# Patient Record
Sex: MALE | Race: WHITE | NOT HISPANIC OR LATINO | Employment: FULL TIME | URBAN - METROPOLITAN AREA
[De-identification: names, ages, dates, MRNs, and addresses within clinical notes are randomized per-mention and may not be internally consistent; named-entity substitution may affect disease eponyms.]

---

## 2017-01-03 ENCOUNTER — OFFICE VISIT (OUTPATIENT)
Dept: SLEEP MEDICINE | Facility: CLINIC | Age: 42
End: 2017-01-03
Attending: FAMILY MEDICINE
Payer: COMMERCIAL

## 2017-01-03 VITALS
DIASTOLIC BLOOD PRESSURE: 80 MMHG | HEART RATE: 83 BPM | HEIGHT: 73 IN | WEIGHT: 225 LBS | SYSTOLIC BLOOD PRESSURE: 119 MMHG | BODY MASS INDEX: 29.82 KG/M2 | OXYGEN SATURATION: 97 %

## 2017-01-03 DIAGNOSIS — E66.3 OVERWEIGHT: Primary | ICD-10-CM

## 2017-01-03 DIAGNOSIS — G47.33 OSA (OBSTRUCTIVE SLEEP APNEA): ICD-10-CM

## 2017-01-03 PROCEDURE — 99204 OFFICE O/P NEW MOD 45 MIN: CPT | Performed by: INTERNAL MEDICINE

## 2017-01-03 NOTE — PROGRESS NOTES
[facility]  Outpatient Sleep Medicine Consultation  January 3, 2017      Name: Wayne Lam MRN# [id number]   Age: 41 year old YOB: 1975     Date of Consultation: January 3, 2017  Consultation is requested by: Diogo Chiu MD  [facility]  [address]  [place] [address]  Primary care provider: Diogo Chiu  Home clinic: [facility]         Reason for Sleep Consult:     Wayne Lam is a 41 year old male reevaluation of sleep apnea.         Assessment and Plan:     Summary Sleep Diagnoses/Recommendations:    1. Mild Obstructive Sleep Apnea: Discussed with patient the recent sleep study and treatment options, we recommend oral appliance for the treatment of his mild DEJUAN and get relieve from snoring. Avoid sleeping supine position and weight loss as below.  Instructions given. Follow up 6 moths after oral device use and repeat home sleep study to determine efficacy of oral device.    2. Overweight:  Counseled regarding weight loss through diet modification and increased physical activity. Patient was given instuctions of weight loss and advised to follow up her PCP for further weight loss interventions.      Orders Placed This Encounter   Procedures     SLEEP DENTAL REFERRAL       Summary Counseling:  See instructions    Counseling included a comprehensive review of diagnostic and therapeutic strategies as well as risks of inadequate therapy.  Educational materials provided in instructions.    All questions were answered.  The patient indicates understanding of the above issues and agrees with the plan set forth.           History of Present Illness:     Wayne Lam is a 41 year old male with history of sleep apnea and overweight who presents to the [facility] in [place] for re-evaluation of sleep apnea, establish care. Patient had free testing sleep study at [facility] sleep clinic on 12/1/16 which showed mild  sleep apnea AHI 7.9/hr (RDI 31.7/hr). He snores but not sure stop breathing or gasping. He toss and turn.  He feels tired and sleep (EDS) during the day.He gained 30 lbs.       Please see below for sleep ROS details.    PREVIOUS IN- LAB or HOME SLEEP STUDIES:   Date: 12/1/16   TYPE: PSG   AHI: 7.9/hr   Lowest O 2 sat: 88%   Intervention: APAP recommended   Sleep Architecture: all stage sleeps achieved, reduced REM       SLEEP-WAKE SCHEDULE:     Wayne Lam     -Describes themself as a morning person;      -ON WEEKDAYS, goes to sleep at 9:00 PM during the week; awakens  7:00 AM with an alarm; falls asleep in 5 minutes; denies difficulty falling asleep.     -ON WEEKENDS, goes to sleep at 10:00 PM and wakes up at 8:00 AM without an alarm; falls asleep in 5 minutes.       -Awakens 2 times a night for 5 minutes before falling back to sleep; awakens to go to the bathroom.      -Total sleep time: 8 hours per night.    -Naps 0 times per week.      BEDTIME ACTIVITIES AND SHIFT WORK:    Wayne Lam    -does watch TV in bed and does not use electronics in bed and read in bed.     -does not do shift work.  He/she works day shifts.       SCALES       SLEEP APNEA: Stopbang score: 4       INSOMNIA:  Insomnia severity score: N/A       SLEEPINESS: Downey sleepiness scale (ESS):  12   Drowsy driving/near accidents: No          PHQ9: N/A    SLEEP COMPLAINTS:   Snoring- 7 days/week  Witness apnea: Yes  Gasping/Choking: Yes  Excessive daytime sleep: Yes  Toss/turn: Yes  Excessive tiredness/fatigue:  Yes  Morning headaches: Yes  Dry mouth/throat: Yes  Dyspnea: No  Coexisting Lung disease: No    Coexisting Heart disease: No    Does patient have a bed partner: Yes  Has bed partner been sleeping separately because of snoring:  No            RLS Screen: When you try to relax in the evening or sleep at  night, do you ever have unpleasant, restless feelings in your  legs that can be relieved by walking or movement? No    Periodic limb  movement: No    Narcolepsy:       denies sudden urges of sleep attacks     denies cataplexy     denies sleep paralysis      denies hallucinations     Sleep Behaviors:     denies leg symptoms/movements     denies motor restlessness     denies night terrors     denies bruxism     denies automatic behaviors    Other subjective complaints:     denies anxiety or rumination      denies pain and discomfort at  night     denies waking up with heart pounding or racing     denies GERD/heartburn         Parasomnia:   NREM - denies recurrent persistent confusional arousal, night eating or sleep terrors. He had sleep walking, last more than years ago.  REM  - denies dream enactment; injuries     Safety: None             Medications:     Current Outpatient Prescriptions   Medication Sig     PARoxetine (PAXIL) 10 MG tablet Take 0.5-1 tablets (5-10 mg) by mouth At Bedtime     finasteride (PROPECIA) 1 MG tablet Take 1 tablet (1 mg) by mouth daily     No current facility-administered medications for this visit.        Medication that can affect sleep: Paxil    No Known Allergies         Past Medical History:     Does not need 02 supplement at night     Past Medical History   Diagnosis Date     Tobacco abuse 2/3/2012     CARDIOVASCULAR SCREENING; LDL GOAL LESS THAN 160 2/3/2012     BPH (benign prostatic hyperplasia) 2/3/2012     Paralysis (H) 2/3/2012     Snoring 2/3/2012     Accessory nipple      Overweight (BMI 25.0-29.9) 2/3/2012               Past Surgical History:     No previous upper airway surgery     Past Surgical History   Procedure Laterality Date     Lasik bilateral       Hair transplant              Social History:     Social History   Substance Use Topics     Smoking status: Former Smoker     Types: Cigarettes     Smokeless tobacco: Never Used      Comment: 2 packs/week     Alcohol Use: 0.0 oz/week     0 Standard drinks or equivalent per week      Comment: occas         Chemical History:     Tobacco: No, former smoker     "  Uses 1-2 cups/day of coffee. Last caffeine intake is usually before 9 am    Supplements for wakefulness: No    EtOH: Yes, occasionally  Recreational Drugs: No    Psych Hx:   None    Current dangers to self or others: None           Family History:     Family History   Problem Relation Age of Onset     CANCER       breast     Myocardial Infarction       CEREBROVASCULAR DISEASE          Sleep Family Hx:        RLS- Brother  DEJUAN - Father  Insomnia - No  Parasomnia - No         Review of Systems:     A complete 10 point review of systems was negative other than HPI or as commented below:   Patient denies chest pain, dyspnea with activity and or rest, wheezing, abdominal pain, n&v, fever, chills, dysuria, leg pain or swelling. Patient is also denies ear pain, sore throat, postnasal drip, running nose, dry cough.      Wayne Lam has gained 30 pounds in 10 years.            Physical Examination:   /80 mmHg  Pulse 83  Ht 1.854 m (6' 1\")  Wt 102.059 kg (225 lb)  BMI 29.69 kg/m2  SpO2 97%     Neck Circumference: 44 cm   Constitutional: . Awake, alert, cooperative, in no apparent distress  Mood: euthymic; affect congruent with full range and intensity.  Attention/Concentration:  Normal   Eyes: Pupils round and reactive. No icterus.  ENT: Mallampati Class: IV.   Tonsillar Stage: 0  surgically removed or 1  hidden by pillars, right +2 bigger than left  Clear nasal passages. Enlarged inferior turbinates. No deviated septum.  Oropharynx: No high arched palate. No pharyngeal erythema or exudates, elongated uvula. No lateral narrowing  Tongue: No macroglossia   Dentition: Good.  Dentures: None  Neck: Supple, no thyroid enlargement.   Cardiovascular: Regular S1 and S2, no gallops or murmurs.   Pulmonary:  Chest symmetric, lungs clear bilaterally and no crackles, wheezes or rales.  Abdomen: Soft, obese, non tender.  Extremities:  No pedal edema. Tattoos left arm.  Muscle/joint: Strength and tone normal   Skin:  No rash " or significant lesions.   Neurologic: Alert, oriented x3, no focal neurological deficit.           Data: All pertinent previous laboratory data reviewed     No results found for: PH, PHARTERIAL, PO2, [id number], SAT, PCO2, HCO3, BASEEXCESS, MARK, BEB  Lab Results   Component Value Date    TSH 1.60 09/27/2016    TSH 1.05 02/03/2012     Lab Results   Component Value Date    * 09/27/2016    GLC 94 02/14/2012     Lab Results   Component Value Date    HGB 15.1 02/03/2012     Lab Results   Component Value Date    BUN 13 09/27/2016    BUN 17 02/14/2012    CR 1.34* 09/27/2016    CR 1.25 02/14/2012     Lab Results   Component Value Date    CO2 28 09/27/2016    CO2 27 02/14/2012     No results found for: VARUN      Echocardiography: No    Chest x-ray: No    PFT: 2/3/12, was normal  FVC 88%  FEV1: 84%  FVC/FEV1: 93%        Copy to: Diogo Chiu MD 1/3/2017   [facility] [address], [place] [address]   [phone] Clinic    Total time spent with patient: 46 minutes with this patient today in which 25 minutes was spent in counseling/coordination of care and going over planned testing and recommendations.

## 2017-01-03 NOTE — PATIENT INSTRUCTIONS
"MY CONTACT NUMBERS ARE:  [phone]  DOCTOR : Art ROSADO hetal  SLEEP CENTER :  [facility]    You have mild sleep apnea/snoring: oral appliance is recommended for you.    Frequently asked questions:  1. What is Obstructive Sleep Apnea (DEJUAN)? DEJUAN is the most common type of sleep apnea. Apnea literally means, \"without breath.\" It is characterized by repetitive pauses in breathing, despite continued effort to breathe, and is usually associated with a reduction in blood oxygen saturation. Apneas can last 10 to over 60 seconds. It is caused by narrowing or collapse of the upper airway as muscles relax during sleep. Severity of sleep apnea is determined by frequency of breathing events and their effect on your sleep and oxygen levels determined during sleep testing.   2. What are the consequences of DEJUAN? Symptoms include: daytime sleepiness- possibly increasing the risk of falling asleep while driving, unrefreshing/restless sleep, snoring, insomnia, waking frequently to urinate, waking with heartburn or reflux, reduced concentration and memory, and morning headaches. Other health consequences may include development of high blood pressure and other cardiovascular disease in persons who are susceptible. Untreated DEJUAN  can contribute to heart disease, stroke and diabetes.   3. What are the treatment options? In most situations, sleep apnea is a lifelong disease that must be managed with daily therapy. Medications are not effective for sleep apnea and surgery is generally not performed until other therapies have been tried. Therapy is usually tailored to the individual patient based on many factors including your wishes as well as severity of sleep apnea and severity of obesity. Continuous Positive Airway (CPAP) is the most reliable treatment. An oral device to hold your jaw forward is usually  Oral Appliance  These are examples of two of many custom-made devices that are more likely to work in mild sleep apnea           "                                            Oral appliances are dental mouth pieces that fit very much like a sports mouth guards or removable orthodontic retainers. They are used to treat snoring  And obstructive sleep apnea . The device prevents the airway from collapsing by either holding the tongue or supporting the jaw in a forward position. Since oral appliances are non-invasive and easy to use, they may be considered as an early treatment option. Oral appliance therapy (OAT) involves the customization, selection, fabrication, fitting, adjustments and long-term follow-up care of specially designed oral devices, worn during sleep, which reposition the lower jaw and tongue base forward to maintain an open airway.  Custom made oral appliances are proven to be more effective than over-the-counter devices. Therefore, the over-the-counter devices are recommended not to be used as a screening tool nor as a therapeutic option.     Who gets a dental device?  Oral appliance therapy can be used as an alternative to  CPAP therapy for the treatment of mild to moderate sleep apnea and for those patients who prefer OAT to CPAP. Oral appliance therapy is a first line therapy for the treatment of primary snoring. Additionally, OAT is an option for those that cannot tolerate CPAP as therapy or who have experienced insufficient surgical results.                 Possible side effects?  Frequent but minor side effects include: excessive salivation, dry mouth, discomfort of teeth and jaw and temporary changes in the patient s bite.  Potential complications include: jaw pain, permanent occlusal changes and TMJ symptoms.  The above mentioned side effects and complications can be recognized and managed by dentists trained in dental sleep medicine.   Finding a dentist that practices dental sleep medicine  Specific training is available through the American Academy of Dental Sleep Medicine for dentists interested in working in the field  of sleep. To find a dentist who is educated in the field of sleep and the use of oral appliances, near you, visit the Web site of the American Academy of Dental Sleep Medicine; also see http://www.accpstorage.org/newOrganization/patients/oralAppliances.pdf   To search for a dentist certified in these practices:  Http://aadsm.org/FindADentist.aspx?1  Http://www.accpstorage.org/newOrganization/patients/oralAppliances.pdf        Your BMI is Body mass index is 29.69 kg/(m^2).  Weight management is a personal decision.  If you are interested in exploring weight loss strategies, the following discussion covers the approaches that may be successful. Body mass index (BMI) is one way to tell whether you are at a healthy weight, overweight, or obese. It measures your weight in relation to your height.  A BMI of 18.5 to 24.9 is in the healthy range. A person with a BMI of 25 to 29.9 is considered overweight, and someone with a BMI of 30 or greater is considered obese. More than two-thirds of American adults are considered overweight or obese.  Being overweight or obese increases the risk for further weight gain. Excess weight may lead to heart disease and diabetes.  Creating and following plans for healthy eating and physical activity may help you improve your health.  Weight control is part of healthy lifestyle and includes exercise, emotional health, and healthy eating habits. Careful eating habits lifelong are the mainstay of weight control. Though there are significant health benefits from weight loss, long-term weight loss with diet alone may be very difficult to achieve- studies show long-term success with dietary management in less than 10% of people. Attaining a healthy weight may be especially difficult to achieve in those with severe obesity. In some cases, medications, devices and surgical management might be considered.  What can you do?  If you are overweight or obese and are interested in methods for weight loss,  you should discuss this with your provider.     Consider reducing daily calorie intake by 500 calories.     Keep a food journal.     Avoiding skipping meals, consider cutting portions instead.    Diet combined with exercise helps maintain muscle while optimizing fat loss. Strength training is particularly important for building and maintaining muscle mass. Exercise helps reduce stress, increase energy, and improves fitness. Increasing exercise without diet control, however, may not burn enough calories to loose weight.       Start walking three days a week 10-20 minutes at a time    Work towards walking thirty minutes five days a week     Eventually, increase the speed of your walking for 1-2 minutes at time    In addition, we recommend that you review healthy lifestyles and methods for weight loss available through the National Institutes of Health patient information sites:  http://win.niddk.nih.gov/publications/index.htm    And look into health and wellness programs that may be available through your health insurance provider, employer, local community center, or [employer].      Your blood pressure was checked while you were in clinic today.  Please read the guidelines below about what these numbers mean and what you should do about them.  Your systolic blood pressure is the top number.  This is the pressure when the heart is pumping.  Your diastolic blood pressure is the bottom number.  This is the pressure in between beats.  If your systolic blood pressure is less than 120 and your diastolic blood pressure is less than 80, then your blood pressure is normal. There is nothing more that you need to do about it  If your systolic blood pressure is 120-139 or your diastolic blood pressure is 80-89, your blood pressure may be higher than it should be.  You should have your blood pressure re-checked within a year by a primary care provider.  If your systolic blood pressure is 140 or greater or your diastolic blood  pressure is 90 or greater, you may have high blood pressure.  High blood pressure is treatable, but if left untreated over time it can put you at risk for heart attack, stroke, or kidney failure.  You should have your blood pressure re-checked by a primary care provider within the next four weeks.

## 2017-01-03 NOTE — NURSING NOTE
"Chief Complaint   Patient presents with     Consult     DEJUAN , Ref Chiu       Initial /80 mmHg  Pulse 83  Ht 1.854 m (6' 1\")  Wt 102.059 kg (225 lb)  BMI 29.69 kg/m2  SpO2 97% Estimated body mass index is 29.69 kg/(m^2) as calculated from the following:    Height as of this encounter: 1.854 m (6' 1\").    Weight as of this encounter: 102.059 kg (225 lb).  BP completed using cuff size: regular      Neck 44  17 1/2  Ess 12    Gabrielle Taveras LPN  "

## 2017-01-03 NOTE — MR AVS SNAPSHOT
"              After Visit Summary   1/3/2017    Wayne Lam    MRN: [id number]           Patient Information     Date Of Birth          1975        Visit Information        Provider Department      1/3/2017 10:00 AM Art Delarosa MD [facility]        Today's Diagnoses     DEJUAN (obstructive sleep apnea)           Care Instructions    MY CONTACT NUMBERS ARE:  [phone]  DOCTOR : Art Delarosa  SLEEP CENTER :  [place]    You have mild sleep apnea/snoring: oral appliance is recommended for you.    Frequently asked questions:  1. What is Obstructive Sleep Apnea (DEJUAN)? DEJUAN is the most common type of sleep apnea. Apnea literally means, \"without breath.\" It is characterized by repetitive pauses in breathing, despite continued effort to breathe, and is usually associated with a reduction in blood oxygen saturation. Apneas can last 10 to over 60 seconds. It is caused by narrowing or collapse of the upper airway as muscles relax during sleep. Severity of sleep apnea is determined by frequency of breathing events and their effect on your sleep and oxygen levels determined during sleep testing.   2. What are the consequences of DEJUAN? Symptoms include: daytime sleepiness- possibly increasing the risk of falling asleep while driving, unrefreshing/restless sleep, snoring, insomnia, waking frequently to urinate, waking with heartburn or reflux, reduced concentration and memory, and morning headaches. Other health consequences may include development of high blood pressure and other cardiovascular disease in persons who are susceptible. Untreated DEJUAN  can contribute to heart disease, stroke and diabetes.   3. What are the treatment options? In most situations, sleep apnea is a lifelong disease that must be managed with daily therapy. Medications are not effective for sleep apnea and surgery is generally not performed until other therapies have been tried. Therapy is usually tailored to " the individual patient based on many factors including your wishes as well as severity of sleep apnea and severity of obesity. Continuous Positive Airway (CPAP) is the most reliable treatment. An oral device to hold your jaw forward is usually  Oral Appliance  These are examples of two of many custom-made devices that are more likely to work in mild sleep apnea                                                      Oral appliances are dental mouth pieces that fit very much like a sports mouth guards or removable orthodontic retainers. They are used to treat snoring  And obstructive sleep apnea . The device prevents the airway from collapsing by either holding the tongue or supporting the jaw in a forward position. Since oral appliances are non-invasive and easy to use, they may be considered as an early treatment option. Oral appliance therapy (OAT) involves the customization, selection, fabrication, fitting, adjustments and long-term follow-up care of specially designed oral devices, worn during sleep, which reposition the lower jaw and tongue base forward to maintain an open airway.  Custom made oral appliances are proven to be more effective than over-the-counter devices. Therefore, the over-the-counter devices are recommended not to be used as a screening tool nor as a therapeutic option.     Who gets a dental device?  Oral appliance therapy can be used as an alternative to  CPAP therapy for the treatment of mild to moderate sleep apnea and for those patients who prefer OAT to CPAP. Oral appliance therapy is a first line therapy for the treatment of primary snoring. Additionally, OAT is an option for those that cannot tolerate CPAP as therapy or who have experienced insufficient surgical results.                 Possible side effects?  Frequent but minor side effects include: excessive salivation, dry mouth, discomfort of teeth and jaw and temporary changes in the patient s bite.  Potential complications include:  jaw pain, permanent occlusal changes and TMJ symptoms.  The above mentioned side effects and complications can be recognized and managed by dentists trained in dental sleep medicine.   Finding a dentist that practices dental sleep medicine  Specific training is available through the American Academy of Dental Sleep Medicine for dentists interested in working in the field of sleep. To find a dentist who is educated in the field of sleep and the use of oral appliances, near you, visit the Web site of the American Academy of Dental Sleep Medicine; also see http://www.accpstorage.org/newOrganization/patients/oralAppliances.pdf   To search for a dentist certified in these practices:  Http://aadsm.org/FindADentist.aspx?1  Http://www.accpstorage.org/newOrganization/patients/oralAppliances.pdf        Your BMI is Body mass index is 29.69 kg/(m^2).  Weight management is a personal decision.  If you are interested in exploring weight loss strategies, the following discussion covers the approaches that may be successful. Body mass index (BMI) is one way to tell whether you are at a healthy weight, overweight, or obese. It measures your weight in relation to your height.  A BMI of 18.5 to 24.9 is in the healthy range. A person with a BMI of 25 to 29.9 is considered overweight, and someone with a BMI of 30 or greater is considered obese. More than two-thirds of American adults are considered overweight or obese.  Being overweight or obese increases the risk for further weight gain. Excess weight may lead to heart disease and diabetes.  Creating and following plans for healthy eating and physical activity may help you improve your health.  Weight control is part of healthy lifestyle and includes exercise, emotional health, and healthy eating habits. Careful eating habits lifelong are the mainstay of weight control. Though there are significant health benefits from weight loss, long-term weight loss with diet alone may be very  difficult to achieve- studies show long-term success with dietary management in less than 10% of people. Attaining a healthy weight may be especially difficult to achieve in those with severe obesity. In some cases, medications, devices and surgical management might be considered.  What can you do?  If you are overweight or obese and are interested in methods for weight loss, you should discuss this with your provider.     Consider reducing daily calorie intake by 500 calories.     Keep a food journal.     Avoiding skipping meals, consider cutting portions instead.    Diet combined with exercise helps maintain muscle while optimizing fat loss. Strength training is particularly important for building and maintaining muscle mass. Exercise helps reduce stress, increase energy, and improves fitness. Increasing exercise without diet control, however, may not burn enough calories to loose weight.       Start walking three days a week 10-20 minutes at a time    Work towards walking thirty minutes five days a week     Eventually, increase the speed of your walking for 1-2 minutes at time    In addition, we recommend that you review healthy lifestyles and methods for weight loss available through the National Institutes of Health patient information sites:  http://win.niddk.nih.gov/publications/index.htm    And look into health and wellness programs that may be available through your health insurance provider, employer, local community center, or ed[employer]ardo club.      Your blood pressure was checked while you were in clinic today.  Please read the guidelines below about what these numbers mean and what you should do about them.  Your systolic blood pressure is the top number.  This is the pressure when the heart is pumping.  Your diastolic blood pressure is the bottom number.  This is the pressure in between beats.  If your systolic blood pressure is less than 120 and your diastolic blood pressure is less than 80, then your blood  pressure is normal. There is nothing more that you need to do about it  If your systolic blood pressure is 120-139 or your diastolic blood pressure is 80-89, your blood pressure may be higher than it should be.  You should have your blood pressure re-checked within a year by a primary care provider.  If your systolic blood pressure is 140 or greater or your diastolic blood pressure is 90 or greater, you may have high blood pressure.  High blood pressure is treatable, but if left untreated over time it can put you at risk for heart attack, stroke, or kidney failure.  You should have your blood pressure re-checked by a primary care provider within the next four weeks.              Follow-ups after your visit        Additional Services     SLEEP DENTAL REFERRAL       Dental appliance resources recommended by [facility]     Below is a list of dental appliance resources recommended by [facility]   If you wish to choose your own dental sleep dentist, you may identify a provider close to you: http://www.[email or web address]/[email or web address]    [facility]   [facility]   Trung Adams DDS, [address]  [place] [address]   Appointments: [phone]  Fax: [phone]   Email: [email or web address]   [facility]   Canelo Urbano, ELIZABETH Davis DDS   [address]   [place] [address]   Appointments: [phone]   Website: [email or web address]    Snoring and Sleep Apnea   D[facility] Treatment Center   DERRICK Heath DDS  [address], [address]   [place] [address]   Appointments: [phone]   Fax: [phone]   Email: [email or web address]  Website: [employer]     [facility]   Office 1   Sourav Varner DDS - [DME Medicare]  [address] [place] [address]  [facility], [address]   [place] [address]  Appointments: [phone]  Fax: [phone]  Website: [employer]    [facility]   Office 2  Sourav Varner DDS - [DME Medicare]  [address]  [place] [address]  Appointments: [phone]  Fax: [phone]  Website: [facility]    [facility]  Glynn Hernandez DDS  [address] [place] [address]  Appointments: [phone]    [facility]   [place] Office   Justo Davis DDS   [facility]   [address], [address]   [place] [address]   Appointments: [phone]   Fax: [phone]   Website: [employer]     [facility]   [place] Office   Kin Young DDS, MS - [DME Medicare]  [facility]   [address], [address]   [place] [address]   Appointments: [phone]   Fax: [phone]   Website: [employer]     [facility]  Jason Rankin DMD, MSD - [DME Medicare]  [address], [address]  [place] [address]  Appointments [phone]  Fax: [phone]  Website: [employer]    [facility]   [place] Office   Edwige Bassett DDS, PhD, MS   [facility]   [address]   [place] [address]   Appointments: [phone]   Fax: [phone]   Website: [employer]     [facility]   [place] Office   Edwige Bassett DDS, PhD, MS   [facility]   [address]   [place] [address]   Appointments: [phone]   Fax: [phone]   Website: [employer]     [facility]  Tawana Bell DDS  [facility]  [address] [place] [address]  Appointments: [phone]   Fax: [phone]      If you wish to choose your own dental sleep dentist, you may identify a provider close to you:  [email or web address]      AHI: 7.9 PSG DATE: 12/1/16     Return to clinic in 6 months for Home Sleep Test to confirm adequacy of Treatment.    Other information regarding this referral: Mandibular repositioning appliance for DEJUAN. If your insurance asks for a CPT code, it is .    Please be aware that coverage of these services is subject to the terms and limitations of your health insurance plan.  Call member services at your health plan with any benefit or coverage questions.      Please bring the following to your appointment:    >>   List of current medications   >>   This referral request   >>   Any documents/labs given to you for this referral                  Who to contact     If you have questions or need follow up information about today's clinic visit or your schedule please contact [facility] directly at [phone].  Normal or non-critical lab and imaging results will be communicated to you by MyChart, letter or phone within 4 business days after the clinic has received the results. If you do not hear from us within 7 days, please contact the clinic through DisplayLinkhart or phone. If you have a critical or abnormal lab result, we will notify you by phone as soon as possible.  Submit refill requests through [employer]Music or call your pharmacy and they will forward the refill request to us. Please allow 3 business days for your refill to be completed.          Additional Information About Your Visit        DisplayLinkharSCREEMO Information     [employer] gives you secure access to your electronic health record. If you see a primary care provider, you can also send messages to your care team and make appointments. If you have questions, please call your primary care clinic.  If you do not have a primary care provider, please call [phone] and they will assist you.        Care EveryWhere ID     This is your Care EveryWhere ID. This could be used by other organizations to access your  "[place] medical records  [id number]        Your Vitals Were     Pulse Height BMI (Body Mass Index) Pulse Oximetry          83 1.854 m (6' 1\") 29.69 kg/m2 97%         Blood Pressure from Last 3 Encounters:   01/03/17 119/80   09/27/16 110/82   01/13/16 120/80    Weight from Last 3 Encounters:   01/03/17 102.059 kg (225 lb)   09/27/16 104.327 kg (230 lb)   01/13/16 101.152 kg (223 lb)              We Performed the Following     SLEEP DENTAL REFERRAL     SLEEP EVALUATION & MANAGEMENT REFERRAL - ADULT        Primary Care Provider Office Phone # Fax #    Diogo Remi Chiu [phone]       [facility] [address] [facility] [address]        Thank you!     Thank you for choosing [facility]  for your care. Our goal is always to provide you with excellent care. Hearing back from our patients is one way we can continue to improve our services. Please take a few minutes to complete the written survey that you may receive in the mail after your visit with us. Thank you!             Your Updated Medication List - Protect others around you: Learn how to safely use, store and throw away your medicines at www.disposemymeds.org.          This list is accurate as of: 1/3/17 10:40 AM.  Always use your most recent med list.                   Brand Name Dispense Instructions for use    finasteride 1 MG tablet    PROPECIA    30 tablet    Take 1 tablet (1 mg) by mouth daily       PARoxetine 10 MG tablet    PAXIL    90 tablet    Take 0.5-1 tablets (5-10 mg) by mouth At Bedtime         "

## 2017-01-04 ENCOUNTER — MYC MEDICAL ADVICE (OUTPATIENT)
Dept: FAMILY MEDICINE | Facility: CLINIC | Age: 42
End: 2017-01-04
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
## 2017-01-10 ENCOUNTER — OFFICE VISIT (OUTPATIENT)
Dept: FAMILY MEDICINE | Facility: CLINIC | Age: 42
End: 2017-01-10
Payer: COMMERCIAL

## 2017-01-10 VITALS
TEMPERATURE: 98.2 F | BODY MASS INDEX: 30.35 KG/M2 | WEIGHT: 229 LBS | RESPIRATION RATE: 16 BRPM | HEART RATE: 66 BPM | DIASTOLIC BLOOD PRESSURE: 70 MMHG | OXYGEN SATURATION: 99 % | HEIGHT: 73 IN | SYSTOLIC BLOOD PRESSURE: 102 MMHG

## 2017-01-10 DIAGNOSIS — M54.89 OTHER CHRONIC BACK PAIN: Primary | ICD-10-CM

## 2017-01-10 DIAGNOSIS — G89.29 OTHER CHRONIC BACK PAIN: Primary | ICD-10-CM

## 2017-01-10 PROBLEM — M54.50 CHRONIC MIDLINE LOW BACK PAIN WITHOUT SCIATICA: Status: ACTIVE | Noted: 2017-01-10

## 2017-01-10 PROCEDURE — 99214 OFFICE O/P EST MOD 30 MIN: CPT | Performed by: FAMILY MEDICINE

## 2017-01-10 ASSESSMENT — ENCOUNTER SYMPTOMS
HEADACHES: 0
BACK PAIN: 1
FOCAL WEAKNESS: 0
BLURRED VISION: 0
TREMORS: 0
CONSTITUTIONAL NEGATIVE: 1
SENSORY CHANGE: 0
DOUBLE VISION: 0
DIZZINESS: 0

## 2017-01-10 NOTE — NURSING NOTE
"Chief Complaint   Patient presents with     Back Pain       Initial /70 mmHg  Pulse 66  Temp(Src) 98.2  F (36.8  C) (Oral)  Resp 16  Ht 6' 1\" (1.854 m)  Wt 229 lb (103.874 kg)  BMI 30.22 kg/m2  SpO2 99% Estimated body mass index is 30.22 kg/(m^2) as calculated from the following:    Height as of this encounter: 6' 1\" (1.854 m).    Weight as of this encounter: 229 lb (103.874 kg).  BP completed using cuff size: chiara Andrade CMA      "

## 2017-01-10 NOTE — MR AVS SNAPSHOT
After Visit Summary   1/10/2017    Wayne Lam    MRN: [id number]           Patient Information     Date Of Birth          1975        Visit Information        Provider Department      1/10/2017 9:30 AM Diogo Chiu MD [facility]        Today's Diagnoses     Other chronic back pain    -  1        Follow-ups after your visit        Additional Services     [facility] PT, HAND, AND CHIROPRACTIC REFERRAL       **This order will print in the [facility] Scheduling Office**    Physical Therapy, Hand Therapy and Chiropractic Care are available through:    *[facility]  *[facility]  *[facility]    Call one number to schedule at any of the above locations: [phone].    Your provider has referred you to: Physical Therapy at [facility] or [facility]    Indication/Reason for Referral: Low Back Pain  Onset of Illness: years  Therapy Orders: Evaluate and Treat  Special Programs: None and Spine Care/[facility]  Special Request: None    Joselyn Alves      Additional Comments for the Therapist or Chiropractor:     Please be aware that coverage of these services is subject to the terms and limitations of your health insurance plan.  Call member services at your health plan with any benefit or coverage questions.      Please bring the following to your appointment:    *Your personal calendar for scheduling future appointments  *Comfortable clothing                  Follow-up notes from your care team     Return in about 1 month (around 2/10/2017).      Future tests that were ordered for you today     Open Future Orders        Priority Expected Expires Ordered    MR Lumbar Spine w/o & w Contrast Routine  1/10/2018 1/10/2017    MR Thoracic Spine w/o & w Contrast Routine  1/10/2018 1/10/2017            Who to contact     If you have questions or need follow up information about today's clinic visit or your schedule please contact [facility]  "directly at [phone].  Normal or non-critical lab and imaging results will be communicated to you by MyChart, letter or phone within 4 business days after the clinic has received the results. If you do not hear from us within 7 days, please contact the clinic through CallYourPricehart or phone. If you have a critical or abnormal lab result, we will notify you by phone as soon as possible.  Submit refill requests through [employer] or call your pharmacy and they will forward the refill request to us. Please allow 3 business days for your refill to be completed.          Additional Information About Your Visit        CallYourPriceharTapastreet Information     [employer] gives you secure access to your electronic health record. If you see a primary care provider, you can also send messages to your care team and make appointments. If you have questions, please call your primary care clinic.  If you do not have a primary care provider, please call [phone] and they will assist you.        Care EveryWhere ID     This is your Care EveryWhere ID. This could be used by other organizations to access your [place] medical records  [id number]        Your Vitals Were     Pulse Temperature Respirations Height BMI (Body Mass Index) Pulse Oximetry    66 98.2  F (36.8  C) (Oral) 16 6' 1\" (1.854 m) 30.22 kg/m2 99%       Blood Pressure from Last 3 Encounters:   01/10/17 102/70   01/03/17 119/80   09/27/16 110/82    Weight from Last 3 Encounters:   01/10/17 229 lb (103.874 kg)   01/03/17 225 lb (102.059 kg)   09/27/16 230 lb (104.327 kg)              We Performed the Following     [facility] PT, HAND, AND CHIROPRACTIC REFERRAL        Primary Care Provider Office Phone # Fax #    Diogo Chiu [phone]       [facility] [address]  [facility] [address]        Thank you!     Thank you for choosing [facility]  for your care. Our goal is always to provide you with excellent care. Hearing back from our patients " is one way we can continue to improve our services. Please take a few minutes to complete the written survey that you may receive in the mail after your visit with us. Thank you!             Your Updated Medication List - Protect others around you: Learn how to safely use, store and throw away your medicines at www.disposemymeds.org.          This list is accurate as of: 1/10/17 10:12 AM.  Always use your most recent med list.                   Brand Name Dispense Instructions for use    finasteride 1 MG tablet    PROPECIA    30 tablet    Take 1 tablet (1 mg) by mouth daily       PARoxetine 10 MG tablet    PAXIL    90 tablet    Take 0.5-1 tablets (5-10 mg) by mouth At Bedtime

## 2017-01-18 ENCOUNTER — HOSPITAL ENCOUNTER (OUTPATIENT)
Dept: MRI IMAGING | Facility: CLINIC | Age: 42
Discharge: HOME | End: 2017-01-18
Attending: FAMILY MEDICINE | Admitting: FAMILY MEDICINE
Payer: COMMERCIAL

## 2017-01-18 ENCOUNTER — HOSPITAL ENCOUNTER (OUTPATIENT)
Dept: MRI IMAGING | Facility: CLINIC | Age: 42
End: 2017-01-18
Attending: FAMILY MEDICINE
Payer: COMMERCIAL

## 2017-01-18 ENCOUNTER — THERAPY VISIT (OUTPATIENT)
Dept: PHYSICAL THERAPY | Facility: CLINIC | Age: 42
End: 2017-01-18
Payer: COMMERCIAL

## 2017-01-18 DIAGNOSIS — M54.50 CHRONIC MIDLINE LOW BACK PAIN WITHOUT SCIATICA: Primary | ICD-10-CM

## 2017-01-18 DIAGNOSIS — M54.89 OTHER CHRONIC BACK PAIN: ICD-10-CM

## 2017-01-18 DIAGNOSIS — G89.29 OTHER CHRONIC BACK PAIN: ICD-10-CM

## 2017-01-18 DIAGNOSIS — G89.29 CHRONIC MIDLINE LOW BACK PAIN WITHOUT SCIATICA: Primary | ICD-10-CM

## 2017-01-18 PROBLEM — M54.42 CHRONIC BILATERAL LOW BACK PAIN WITH LEFT-SIDED SCIATICA: Status: RESOLVED | Noted: 2017-01-18 | Resolved: 2017-01-18

## 2017-01-18 PROBLEM — M54.42 CHRONIC BILATERAL LOW BACK PAIN WITH LEFT-SIDED SCIATICA: Status: ACTIVE | Noted: 2017-01-18

## 2017-01-18 PROCEDURE — 72148 MRI LUMBAR SPINE W/O DYE: CPT

## 2017-01-18 PROCEDURE — 97112 NEUROMUSCULAR REEDUCATION: CPT | Mod: GP | Performed by: PHYSICAL THERAPIST

## 2017-01-18 PROCEDURE — 97161 PT EVAL LOW COMPLEX 20 MIN: CPT | Mod: GP | Performed by: PHYSICAL THERAPIST

## 2017-01-18 PROCEDURE — 72146 MRI CHEST SPINE W/O DYE: CPT

## 2017-01-18 PROCEDURE — 97110 THERAPEUTIC EXERCISES: CPT | Mod: GP | Performed by: PHYSICAL THERAPIST

## 2017-01-18 NOTE — PROGRESS NOTES
Subjective:                                       Pertinent medical history includes:  Sleep disorder/apnea.  Medical allergies: no.  Other surgeries include:  None reported.  Current medications:  None as reported by patient.  Current occupation is .    Primary job tasks include:  Prolonged sitting and other (computer work).                              Objective:    System    Physical Exam    General     ROS    Assessment/Plan:
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
## 2017-01-31 ENCOUNTER — THERAPY VISIT (OUTPATIENT)
Dept: PHYSICAL THERAPY | Facility: CLINIC | Age: 42
End: 2017-01-31
Payer: COMMERCIAL

## 2017-01-31 DIAGNOSIS — M54.50 CHRONIC MIDLINE LOW BACK PAIN WITHOUT SCIATICA: Primary | ICD-10-CM

## 2017-01-31 DIAGNOSIS — G89.29 CHRONIC MIDLINE LOW BACK PAIN WITHOUT SCIATICA: Primary | ICD-10-CM

## 2017-01-31 PROCEDURE — 97110 THERAPEUTIC EXERCISES: CPT | Mod: GP | Performed by: PHYSICAL THERAPIST

## 2017-01-31 PROCEDURE — 97112 NEUROMUSCULAR REEDUCATION: CPT | Mod: GP | Performed by: PHYSICAL THERAPIST

## 2017-01-31 NOTE — PROGRESS NOTES
Subjective:    HPI                    Objective:    System    Physical Exam    General     ROS    Assessment/Plan:      DISCHARGE REPORT    Progress reporting period is from 1/18/2017 to 1/31/2017.     SUBJECTIVE    Subjective: Patient reports significant improvement in low back pain since starting physical therapy. He has occasioanl lumbar pain with bending or reaching, but symptoms relieved with stretches. He was able to resume part of his work out routine and is doing yoga 3x/week with noted improved flexibility.   Current Pain level: 0/10   Initial Pain level: 3/10         OBJECTIVE    Objective: Lumbar AROM is full and pain free. Back strength=5-/5; abdominal strength=4-/5      ASSESSMENT/PLAN    STG/LTGs have been met or progress has been made towards goals:  Yes (See Goal flow sheet completed today.)  Assessment of Progress: The patient's condition is improving.  The patient's condition has potential to improve.  Self Management Plans:  Patient is independent in a home treatment program.  Patient is independent in self management of symptoms.    Wayne continues to require the following intervention to meet STG and LTG's: PT intervention is no longer required to meet STG/LTG.      Recommendations:    This patient is ready to be discharged from therapy and continue their home treatment program.    Please refer to the daily flowsheet for treatment today, total treatment time and time spent performing 1:1 timed codes.
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
## 2017-05-24 ENCOUNTER — OFFICE VISIT (OUTPATIENT)
Dept: FAMILY MEDICINE | Facility: CLINIC | Age: 42
End: 2017-05-24
Payer: COMMERCIAL

## 2017-05-24 VITALS
RESPIRATION RATE: 16 BRPM | SYSTOLIC BLOOD PRESSURE: 106 MMHG | BODY MASS INDEX: 29.16 KG/M2 | TEMPERATURE: 97.9 F | OXYGEN SATURATION: 98 % | WEIGHT: 220 LBS | HEIGHT: 73 IN | HEART RATE: 66 BPM | DIASTOLIC BLOOD PRESSURE: 80 MMHG

## 2017-05-24 DIAGNOSIS — G56.03 BILATERAL CARPAL TUNNEL SYNDROME: Primary | ICD-10-CM

## 2017-05-24 PROCEDURE — 99214 OFFICE O/P EST MOD 30 MIN: CPT | Performed by: FAMILY MEDICINE

## 2017-05-24 ASSESSMENT — ENCOUNTER SYMPTOMS
FOCAL WEAKNESS: 0
SENSORY CHANGE: 1
CONSTITUTIONAL NEGATIVE: 1
TINGLING: 1

## 2017-05-24 NOTE — MR AVS SNAPSHOT
After Visit Summary   5/24/2017    Wayne Lam    MRN: [id number]           Patient Information     Date Of Birth          1975        Visit Information        Provider Department      5/24/2017 9:40 AM Diogo Chiu MD [facility]        Today's Diagnoses     Bilateral carpal tunnel syndrome    -  1      Care Instructions    600 mg ibuprofen three times daily for not more than 3 weeks.          Follow-ups after your visit        Additional Services     ORTHO  REFERRAL       [facility] is referring you to the Orthopedic  Services at [facility].       The  Representative will assist you in the coordination of your Orthopedic and Musculoskeletal Care as prescribed by your physician.    The  Representative will call you within 1 business day to help schedule your appointment, or you may contact the  Representative at:    All areas ~ [phone]     Type of Referral : Surgical / Specialist       Timeframe requested: Routine    Coverage of these services is subject to the terms and limitations of your health insurance plan.  Please call member services at your health plan with any benefit or coverage questions.      If X-rays, CT or MRI's have been performed, please contact the facility where they were done to arrange for , prior to your scheduled appointment.  Please bring this referral request to your appointment and present it to your specialist.                  Follow-up notes from your care team     Return in about 1 month (around 6/24/2017), or if symptoms worsen or fail to improve.      Who to contact     If you have questions or need follow up information about today's clinic visit or your schedule please contact [facility] directly at [phone].  Normal or non-critical lab and imaging results will be communicated to you by MyChart, letter or phone within  "4 business days after the clinic has received the results. If you do not hear from us within 7 days, please contact the clinic through [employer] or phone. If you have a critical or abnormal lab result, we will notify you by phone as soon as possible.  Submit refill requests through [employer] or call your pharmacy and they will forward the refill request to us. Please allow 3 business days for your refill to be completed.          Additional Information About Your Visit        [facility] Information     [employer] gives you secure access to your electronic health record. If you see a primary care provider, you can also send messages to your care team and make appointments. If you have questions, please call your primary care clinic.  If you do not have a primary care provider, please call [phone] and they will assist you.        Care EveryWhere ID     This is your Care EveryWhere ID. This could be used by other organizations to access your [facility] medical records  [id number]        Your Vitals Were     Pulse Temperature Respirations Height Pulse Oximetry BMI (Body Mass Index)    66 97.9  F (36.6  C) (Oral) 16 6' 1\" (1.854 m) 98% 29.03 kg/m2       Blood Pressure from Last 3 Encounters:   05/24/17 106/80   01/10/17 102/70   01/03/17 119/80    Weight from Last 3 Encounters:   05/24/17 220 lb (99.8 kg)   01/10/17 229 lb (103.9 kg)   01/03/17 225 lb (102.1 kg)              We Performed the Following     ORTHO  REFERRAL        Primary Care Provider Office Phone # Fax #    Diogo Remi Chiu [phone]       [facility] [address]  [address] [facility] [address]        Thank you!     Thank you for choosing [facility]  for your care. Our goal is always to provide you with excellent care. Hearing back from our patients is one way we can continue to improve our services. Please take a few minutes to complete the written survey that you may receive in the mail after your visit " with us. Thank you!             Your Updated Medication List - Protect others around you: Learn how to safely use, store and throw away your medicines at www.disposemymeds.org.          This list is accurate as of: 5/24/17 10:09 AM.  Always use your most recent med list.                   Brand Name Dispense Instructions for use    finasteride 1 MG tablet    PROPECIA    30 tablet    Take 1 tablet (1 mg) by mouth daily       PARoxetine 10 MG tablet    PAXIL    90 tablet    Take 0.5-1 tablets (5-10 mg) by mouth At Bedtime

## 2017-05-24 NOTE — NURSING NOTE
"Chief Complaint   Patient presents with     Wrist Pain       Initial /80 (BP Location: Right arm, Patient Position: Chair, Cuff Size: Adult Large)  Pulse 66  Temp 97.9  F (36.6  C) (Oral)  Resp 16  Ht 6' 1\" (1.854 m)  Wt 220 lb (99.8 kg)  SpO2 98%  BMI 29.03 kg/m2 Estimated body mass index is 29.03 kg/(m^2) as calculated from the following:    Height as of this encounter: 6' 1\" (1.854 m).    Weight as of this encounter: 220 lb (99.8 kg).  Medication Reconciliation: complete   Dominique Andrade CMA      "

## 2017-05-26 ENCOUNTER — OFFICE VISIT (OUTPATIENT)
Dept: ORTHOPEDICS | Facility: CLINIC | Age: 42
End: 2017-05-26
Payer: COMMERCIAL

## 2017-05-26 VITALS
WEIGHT: 220 LBS | DIASTOLIC BLOOD PRESSURE: 74 MMHG | SYSTOLIC BLOOD PRESSURE: 122 MMHG | HEIGHT: 73 IN | BODY MASS INDEX: 29.16 KG/M2

## 2017-05-26 DIAGNOSIS — G56.03 BILATERAL CARPAL TUNNEL SYNDROME: Primary | ICD-10-CM

## 2017-05-26 PROCEDURE — 99203 OFFICE O/P NEW LOW 30 MIN: CPT | Performed by: ORTHOPAEDIC SURGERY
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
## 2017-05-26 NOTE — NURSING NOTE
"Chief Complaint   Patient presents with     Wrist Pain     bilateral CTS; R > L       Initial /74 (BP Location: Right arm, Patient Position: Chair, Cuff Size: Adult Regular)  Ht 6' 1\" (1.854 m)  Wt 220 lb (99.8 kg)  BMI 29.03 kg/m2 Estimated body mass index is 29.03 kg/(m^2) as calculated from the following:    Height as of this encounter: 6' 1\" (1.854 m).    Weight as of this encounter: 220 lb (99.8 kg).  Medication Reconciliation: complete    "

## 2017-05-26 NOTE — MR AVS SNAPSHOT
"              After Visit Summary   5/26/2017    Wayne Lam    MRN: [id number]           Patient Information     Date Of Birth          1975        Visit Information        Provider Department      5/26/2017 10:00 AM Keo Page MD [facility] ORTHOPEDIC SURGERY        Today's Diagnoses     Bilateral carpal tunnel syndrome    -  1       Follow-ups after your visit        Who to contact     If you have questions or need follow up information about today's clinic visit or your schedule please contact [facility] ORTHOPEDIC SURGERY directly at [phone].  Normal or non-critical lab and imaging results will be communicated to you by N[employer], letter or phone within 4 business days after the clinic has received the results. If you do not hear from us within 7 days, please contact the clinic through [employer] or phone. If you have a critical or abnormal lab result, we will notify you by phone as soon as possible.  Submit refill requests through [employer] or call your pharmacy and they will forward the refill request to us. Please allow 3 business days for your refill to be completed.          Additional Information About Your Visit        MyChart Information     [employer] gives you secure access to your electronic health record. If you see a primary care provider, you can also send messages to your care team and make appointments. If you have questions, please call your primary care clinic.  If you do not have a primary care provider, please call [phone] and they will assist you.        Care EveryWhere ID     This is your Care EveryWhere ID. This could be used by other organizations to access your [facility] medical records  [id number]        Your Vitals Were     Height BMI (Body Mass Index)                6' 1\" (1.854 m) 29.03 kg/m2           Blood Pressure from Last 3 Encounters:   06/07/17 112/80   05/26/17 122/74   05/24/17 106/80    Weight from Last 3 Encounters:   06/07/17 217 lb (98.4 " kg)   05/26/17 220 lb (99.8 kg)   05/24/17 220 lb (99.8 kg)              Today, you had the following     No orders found for display       Primary Care Provider Office Phone # Fax #    Diogo Remi Chiu [phone]       [facility] [address]  [facility] [address]        Equal Access to Services     [facility] : Hadii aad ku hadasho Soomaali, waaxda luqadaha, qaybta kaalmada adeegyada, waxay idiin hayaan adeeg kharash la'tristann ah. So [facility] [phone].    ATENCIÓN: Si habla español, tiene a garcia disposición servicios gratuitos de asistencia lingüística. Llame al [phone].    We comply with applicable federal civil rights laws and [place] laws. We do not discriminate on the basis of race, color, national origin, age, disability sex, sexual orientation or gender identity.            Thank you!     Thank you for choosing [facility] ORTHOPEDIC SURGERY  for your care. Our goal is always to provide you with excellent care. Hearing back from our patients is one way we can continue to improve our services. Please take a few minutes to complete the written survey that you may receive in the mail after your visit with us. Thank you!             Your Updated Medication List - Protect others around you: Learn how to safely use, store and throw away your medicines at www.disposemymeds.org.          This list is accurate as of: 5/26/17 11:59 PM.  Always use your most recent med list.                   Brand Name Dispense Instructions for use Diagnosis    finasteride 1 MG tablet    PROPECIA    30 tablet    Take 1 tablet (1 mg) by mouth daily        IBUPROFEN PO           MULTIVITAMIN ADULT PO           PARoxetine 10 MG tablet    PAXIL    90 tablet    Take 0.5-1 tablets (5-10 mg) by mouth At Bedtime    Premature ejaculation

## 2017-06-02 ENCOUNTER — TELEPHONE (OUTPATIENT)
Dept: ORTHOPEDICS | Facility: CLINIC | Age: 42
End: 2017-06-02

## 2017-06-02 NOTE — TELEPHONE ENCOUNTER
Scheduled surgery for Right carpal tunnel release on 6/14/2017 with Dr. Page @ [facility] @ 9:30.  Surgery education packet provided to patient.

## 2017-06-07 ENCOUNTER — OFFICE VISIT (OUTPATIENT)
Dept: FAMILY MEDICINE | Facility: CLINIC | Age: 42
End: 2017-06-07
Payer: COMMERCIAL

## 2017-06-07 VITALS
OXYGEN SATURATION: 98 % | DIASTOLIC BLOOD PRESSURE: 80 MMHG | SYSTOLIC BLOOD PRESSURE: 112 MMHG | HEART RATE: 66 BPM | WEIGHT: 217 LBS | RESPIRATION RATE: 14 BRPM | TEMPERATURE: 97.8 F | BODY MASS INDEX: 28.63 KG/M2

## 2017-06-07 DIAGNOSIS — Z01.818 PREOP GENERAL PHYSICAL EXAM: Primary | ICD-10-CM

## 2017-06-07 PROCEDURE — 99214 OFFICE O/P EST MOD 30 MIN: CPT | Performed by: FAMILY MEDICINE

## 2017-06-07 ASSESSMENT — ANXIETY QUESTIONNAIRES
5. BEING SO RESTLESS THAT IT IS HARD TO SIT STILL: NOT AT ALL
3. WORRYING TOO MUCH ABOUT DIFFERENT THINGS: NOT AT ALL
IF YOU CHECKED OFF ANY PROBLEMS ON THIS QUESTIONNAIRE, HOW DIFFICULT HAVE THESE PROBLEMS MADE IT FOR YOU TO DO YOUR WORK, TAKE CARE OF THINGS AT HOME, OR GET ALONG WITH OTHER PEOPLE: NOT DIFFICULT AT ALL
7. FEELING AFRAID AS IF SOMETHING AWFUL MIGHT HAPPEN: NOT AT ALL
2. NOT BEING ABLE TO STOP OR CONTROL WORRYING: NOT AT ALL
6. BECOMING EASILY ANNOYED OR IRRITABLE: NOT AT ALL
GAD7 TOTAL SCORE: 0
1. FEELING NERVOUS, ANXIOUS, OR ON EDGE: NOT AT ALL

## 2017-06-07 ASSESSMENT — PATIENT HEALTH QUESTIONNAIRE - PHQ9: 5. POOR APPETITE OR OVEREATING: NOT AT ALL

## 2017-06-07 NOTE — PROGRESS NOTES
HPI      ROS      Physical Exam      [facility]   [address], [address]  [facility] [address]  [phone]  Dept: [phone]    PRE-OP EVALUATION:  Today's date: 2017    Wayne Lam (: 1975) presents for pre-operative evaluation assessment as requested by Dr. Page.  He requires evaluation and anesthesia risk assessment prior to undergoing surgery/procedure for treatment of carpal tunnel.  Proposed procedure: right carpal tunnel release.    Date of Surgery/ Procedure: 17  Time of Surgery/ Procedure: 9:00 AM  Hospital/Surgical Facility: [facility]  Primary Physician: Diogo Chiu  Type of Anesthesia Anticipated: to be determined    Patient has a Health Care Directive or Living Will:  NO    1. NO - Do you have a history of heart attack, stroke, stent, bypass or surgery on an artery in the head, neck, heart or legs?  2. NO - Do you ever have any pain or discomfort in your chest?  3. NO - Do you have a history of  Heart Failure?  4. NO - Are you troubled by shortness of breath when: walking on the level, up a slight hill or at night?  5. NO - Do you currently have a cold, bronchitis or other respiratory infection?  6. NO - Do you have a cough, shortness of breath or wheezing?  7. NO - Do you sometimes get pains in the calves of your legs when you walk?  8. NO - Do you or anyone in your family have previous history of blood clots?  9. NO - Do you or does anyone in your family have a serious bleeding problem such as prolonged bleeding following surgeries or cuts?  10. NO - Have you ever had problems with anemia or been told to take iron pills?  11. NO - Have you had any abnormal blood loss such as black, tarry or bloody stools, or abnormal vaginal bleeding?  12. NO - Have you ever had a blood transfusion?  13. NO - Have you or any of your relatives ever had problems with anesthesia?  14. YES - DO YOU HAVE SLEEP APNEA, EXCESSIVE SNORING OR DAYTIME  DROWSINESS? Sleep apnea   15. NO - Do you have any prosthetic heart valves?  16. NO - Do you have prosthetic joints?  17. NO - Is there any chance that you may be pregnant?      HPI:                                                      Brief HPI related to upcoming procedure: R>L CTS, scheduled for release surgery.  Feeling well, no fever, cold/flu sx, CP, dyspnea,, HA, n/v.      See problem list for active medical problems.  Problems all longstanding and stable, except as noted/documented.  See ROS for pertinent symptoms related to these conditions.                                                                                                  .    MEDICAL HISTORY:                                                      Patient Active Problem List    Diagnosis Date Noted     Premature ejaculation 09/27/2016     Priority: Medium     CARDIOVASCULAR SCREENING; LDL GOAL LESS THAN 160 02/03/2012     Priority: Medium     Snoring 02/03/2012     Priority: Medium     Overweight (BMI 25.0-29.9) 02/03/2012     Priority: Medium      Past Medical History:   Diagnosis Date     Accessory nipple      BPH (benign prostatic hyperplasia) 2/3/2012     CARDIOVASCULAR SCREENING; LDL GOAL LESS THAN 160 2/3/2012     Overweight (BMI 25.0-29.9) 2/3/2012     Paralysis (H) 2/3/2012     Snoring 2/3/2012     Tobacco abuse 2/3/2012     Past Surgical History:   Procedure Laterality Date     hair transplant       LASIK BILATERAL       Current Outpatient Prescriptions   Medication Sig Dispense Refill     IBUPROFEN PO        Multiple Vitamins-Minerals (MULTIVITAMIN ADULT PO)        PARoxetine (PAXIL) 10 MG tablet Take 0.5-1 tablets (5-10 mg) by mouth At Bedtime 90 tablet 1     finasteride (PROPECIA) 1 MG tablet Take 1 tablet (1 mg) by mouth daily 30 tablet 1     OTC products: None, except as noted above    No Known Allergies   Latex Allergy: NO    Social History   Substance Use Topics     Smoking status: Former Smoker     Types: Cigarettes      Smokeless tobacco: Never Used      Comment: 2 packs/week     Alcohol use 0.0 oz/week     0 Standard drinks or equivalent per week      Comment: occas     History   Drug Use No       REVIEW OF SYSTEMS:                                                    C: NEGATIVE for fever, chills, change in weight  E/M: NEGATIVE for ear, mouth and throat problems  R: NEGATIVE for significant cough or SOB  CV: NEGATIVE for chest pain, palpitations or peripheral edema    EXAM:                                                    /80 (BP Location: Right arm, Cuff Size: Adult Large)  Pulse 66  Temp 97.8  F (36.6  C) (Oral)  Resp 14  Wt 217 lb (98.4 kg)  SpO2 98%  BMI 28.63 kg/m2  GENERAL APPEARANCE: healthy, alert and no distress  HENT: ear canals and TM's normal and nose and mouth without ulcers or lesions  RESP: lungs clear to auscultation - no rales, rhonchi or wheezes  CV: regular rate and rhythm, normal S1 S2, no S3 or S4 and no murmur, click or rub   ABDOMEN: soft, nontender, no HSM or masses and bowel sounds normal  NEURO: Normal strength and tone, sensory exam grossly normal, mentation intact and speech normal    DIAGNOSTICS:                                                    EKG: Not indicated due to non-vascular surgery and low risk of event (age <65 and without cardiac risk factors)    Recent Labs   Lab Test  09/27/16   1023  02/14/12   1026  02/03/12   1011   HGB   --    --   15.1   PLT   --    --   174   NA  139  141  141   POTASSIUM  4.1  4.3  4.4   CR  1.34*  1.25  1.35*        IMPRESSION:                                                    Reason for surgery/procedure: R CTS  Diagnosis/reason for consult: preoperative clerance    The proposed surgical procedure is considered LOW risk.    REVISED CARDIAC RISK INDEX  The patient has the following serious cardiovascular risks for perioperative complications such as (MI, PE, VFib and 3  AV Block):  No serious cardiac risks  INTERPRETATION: 0 risks: Class I (very  low risk - 0.4% complication rate)    The patient has the following additional risks for perioperative complications:  No identified additional risks      ICD-10-CM    1. Preop general physical exam Z01.818        RECOMMENDATIONS:                                                      --Patient is to take all scheduled medications on the day of surgery EXCEPT for modifications listed below.    APPROVAL GIVEN to proceed with proposed procedure, without further diagnostic evaluation       Signed Electronically by: Diogo Chiu MD    Copy of this evaluation report is provided to requesting physician.    [facility] Preop Guidelines

## 2017-06-07 NOTE — NURSING NOTE
"Chief Complaint   Patient presents with     Pre-Op Exam     right carpal tunnel release       Initial /80 (BP Location: Right arm, Cuff Size: Adult Large)  Pulse 66  Temp 97.8  F (36.6  C) (Oral)  Resp 14  Wt 217 lb (98.4 kg)  SpO2 98%  BMI 28.63 kg/m2 Estimated body mass index is 28.63 kg/(m^2) as calculated from the following:    Height as of 5/26/17: 6' 1\" (1.854 m).    Weight as of this encounter: 217 lb (98.4 kg).  Medication Reconciliation: complete   Valentine Ac MA    "

## 2017-06-07 NOTE — MR AVS SNAPSHOT
After Visit Summary   6/7/2017    Wayne Lam    MRN: [id number]           Patient Information     Date Of Birth          1975        Visit Information        Provider Department      6/7/2017 10:00 AM Diogo Chiu MD [facility]        Today's Diagnoses     Preop general physical exam    -  1      Care Instructions      Before Your Surgery      Call your surgeon if there is any change in your health. This includes signs of a cold or flu (such as a sore throat, runny nose, cough, rash or fever).    Do not smoke, drink alcohol or take over the counter medicine (unless your surgeon or primary care doctor tells you to) for the 24 hours before and after surgery.    If you take prescribed drugs: Follow your doctor s orders about which medicines to take and which to stop until after surgery.    Eating and drinking prior to surgery: follow the instructions from your surgeon    Take a shower or bath the night before surgery. Use the soap your surgeon gave you to gently clean your skin. If you do not have soap from your surgeon, use your regular soap. Do not shave or scrub the surgery site.  Wear clean pajamas and have clean sheets on your bed.           Follow-ups after your visit        Follow-up notes from your care team     Return if symptoms worsen or fail to improve.      Your next 10 appointments already scheduled     Jun 26, 2017  3:00 PM CDT   Return Visit with Hollis England PA-C   [facility]THOPEDIC SURGERY ([facility])    [address] [address] [address]   [phone]              Who to contact     If you have questions or need follow up information about today's clinic visit or your schedule please contact [facility] directly at [phone].  Normal or non-critical lab and imaging results will be communicated to you by MyChart, letter or phone within 4 business days after the clinic has  received the results. If you do not hear from us within 7 days, please contact the clinic through [employer] or phone. If you have a critical or abnormal lab result, we will notify you by phone as soon as possible.  Submit refill requests through [employer] or call your pharmacy and they will forward the refill request to us. Please allow 3 business days for your refill to be completed.          Additional Information About Your Visit        [employer] Information     [employer] gives you secure access to your electronic health record. If you see a primary care provider, you can also send messages to your care team and make appointments. If you have questions, please call your primary care clinic.  If you do not have a primary care provider, please call [phone] and they will assist you.        Care EveryWhere ID     This is your Care EveryWhere ID. This could be used by other organizations to access your [facility] medical records  [id number]        Your Vitals Were     Pulse Temperature Respirations Pulse Oximetry BMI (Body Mass Index)       66 97.8  F (36.6  C) (Oral) 14 98% 28.63 kg/m2        Blood Pressure from Last 3 Encounters:   06/07/17 112/80   05/26/17 122/74   05/24/17 106/80    Weight from Last 3 Encounters:   06/07/17 217 lb (98.4 kg)   05/26/17 220 lb (99.8 kg)   05/24/17 220 lb (99.8 kg)              Today, you had the following     No orders found for display       Primary Care Provider Office Phone # Fax #    Diogo Remi Chiu [phone]       [facility] [address] [facility] [address]        Thank you!     Thank you for choosing [facility]  for your care. Our goal is always to provide you with excellent care. Hearing back from our patients is one way we can continue to improve our services. Please take a few minutes to complete the written survey that you may receive in the mail after your visit with us. Thank you!             Your Updated  Medication List - Protect others around you: Learn how to safely use, store and throw away your medicines at www.disposemymeds.org.          This list is accurate as of: 6/7/17 10:36 AM.  Always use your most recent med list.                   Brand Name Dispense Instructions for use    finasteride 1 MG tablet    PROPECIA    30 tablet    Take 1 tablet (1 mg) by mouth daily       IBUPROFEN PO          MULTIVITAMIN ADULT PO          PARoxetine 10 MG tablet    PAXIL    90 tablet    Take 0.5-1 tablets (5-10 mg) by mouth At Bedtime

## 2017-06-08 ASSESSMENT — ANXIETY QUESTIONNAIRES: GAD7 TOTAL SCORE: 0

## 2017-06-08 ASSESSMENT — PATIENT HEALTH QUESTIONNAIRE - PHQ9: SUM OF ALL RESPONSES TO PHQ QUESTIONS 1-9: 0

## 2017-06-14 ENCOUNTER — TRANSFERRED RECORDS (OUTPATIENT)
Dept: HEALTH INFORMATION MANAGEMENT | Facility: CLINIC | Age: 42
End: 2017-06-14

## 2017-06-16 ENCOUNTER — TELEPHONE (OUTPATIENT)
Dept: ORTHOPEDICS | Facility: CLINIC | Age: 42
End: 2017-06-16

## 2017-06-16 NOTE — TELEPHONE ENCOUNTER
NA / LVM - Surgical follow up call: Left non descript, confirming follow up and left phone number for questions.    Hollis England PA-C  [facility] - Surgery

## 2017-06-26 ENCOUNTER — OFFICE VISIT (OUTPATIENT)
Dept: ORTHOPEDICS | Facility: CLINIC | Age: 42
End: 2017-06-26
Payer: COMMERCIAL

## 2017-06-26 DIAGNOSIS — Z47.89 ORTHOPEDIC AFTERCARE: Primary | ICD-10-CM

## 2017-06-26 PROCEDURE — 99024 POSTOP FOLLOW-UP VISIT: CPT | Performed by: PHYSICIAN ASSISTANT

## 2017-06-26 NOTE — PROGRESS NOTES
HISTORY OF PRESENT ILLNESS:    Wayne Lam is a 41 year old male who is seen in follow up for Right CTR, DOS 6/14/17, Dr. Truong - Carpal tunnel release.  Present symptoms: Pt reports improvement to CT symptoms.  Is not keeping covered. Pt is using hand for activities. No new complaints.  Denies Chest pain, Calve pain, Fever, Chills.    Current Treatment: Postop.    PHYSICAL EXAM:  There were no vitals taken for this visit.  There is no weight on file to calculate BMI.   GENERAL APPEARANCE: healthy, alert and no distress   PSYCH: mentation appears normal and affect normal/bright    MSK:  Right:  Volar wrist.  Incision clean and dry, Sutures present, healing.  Mild appropriate incisional erythema.   No Ecchymosis.  Edema min at wrist hand and digits.  CMS: nomi incisional numbness, otherwise grossly intact to digits.  AROM: mild restriction in wrist flexion, otherwise WNL.      ASSESSMENT:  Wayne Lam is a 41 year old male  S/P Right CTR, DOS 6/14/17, Dr. Truong - Carpal tunnel release.- CTR.  Symptom improvement. Healing.    We discussed that CT symptoms may improve for several months after surgery.    PLAN:  - Surgery discussed, images reviewed if applicable, and all questions were answered at this time.  - Sutures removed with sterile technique, steri-strips applied in usual fashion, care instructions given and verbally acknowledged.  - Medications: Taper RX pain meds, OTC PRN.  - Physical Therapy: As instructed / RICE and PROM.  - AAT    Return to clinic PRN.    Hollis England PA-C    Dept. Orthopedic Surgery  [facility]     6/26/2017

## 2017-06-26 NOTE — LETTER
6/26/2017       RE: Wayne Lam  [address] [facility] [address]           Dear Colleague,    Thank you for referring your patient, Wayne Lam, to the [facility] ORTHOPEDIC SURGERY. Please see a copy of my visit note below.    HISTORY OF PRESENT ILLNESS:    Wayne Lam is a 41 year old male who is seen in follow up for Right CTR, DOS 6/14/17, Dr. Truong - Carpal tunnel release.  Present symptoms: Pt reports improvement to CT symptoms.  Is not keeping covered. Pt is using hand for activities. No new complaints.  Denies Chest pain, Calve pain, Fever, Chills.    Current Treatment: Postop.    PHYSICAL EXAM:  There were no vitals taken for this visit.  There is no weight on file to calculate BMI.   GENERAL APPEARANCE: healthy, alert and no distress   PSYCH: mentation appears normal and affect normal/bright    MSK:  Right:  Volar wrist.  Incision clean and dry, Sutures present, healing.  Mild appropriate incisional erythema.   No Ecchymosis.  Edema min at wrist hand and digits.  CMS: nomi incisional numbness, otherwise grossly intact to digits.  AROM: mild restriction in wrist flexion, otherwise WNL.      ASSESSMENT:  Wayne Lam is a 41 year old male  S/P Right CTR, DOS 6/14/17, Dr. Truong - Carpal tunnel release.- CTR.  Symptom improvement. Healing.    We discussed that CT symptoms may improve for several months after surgery.    PLAN:  - Surgery discussed, images reviewed if applicable, and all questions were answered at this time.  - Sutures removed with sterile technique, steri-strips applied in usual fashion, care instructions given and verbally acknowledged.  - Medications: Taper RX pain meds, OTC PRN.  - Physical Therapy: As instructed / RICE and PROM.  - AAT    Return to clinic PRN.    Hollis England PA-C    Dept. Orthopedic Surgery  [facility]     6/26/2017          Again, thank you for allowing me to participate in the care of your patient.         Sincerely,              Hollis England PA-C

## 2017-06-26 NOTE — MR AVS SNAPSHOT
After Visit Summary   6/26/2017    Wayne Lam    MRN: [id number]           Patient Information     Date Of Birth          1975        Visit Information        Provider Department      6/26/2017 3:00 PM Hollis England PA-C [facility] ORTHOPEDIC SURGERY        Today's Diagnoses     Orthopedic aftercare    -  1      Care Instructions      Incision Care:  Sutures were removed and Steri-Strips applied in usual fashion.  Keep dry 24-48 hours.  Showering ok after that time, however no soaking or scrubbing of incision for 1 weeks.  Steri-strips will most likely fall off on their own, however they may be removed after 1 weeks with rubbing alcohol if they have not.    Gradually increase your activities as you can tolerated them, starting at a level well below what you would normally do.   Follow up as needed in clinic.            Follow-ups after your visit        Follow-up notes from your care team     Return if symptoms worsen or fail to improve.      Who to contact     If you have questions or need follow up information about today's clinic visit or your schedule please contact [facility] ORTHOPEDIC SURGERY directly at [phone].  Normal or non-critical lab and imaging results will be communicated to you by [employer], letter or phone within 4 business days after the clinic has received the results. If you do not hear from us within 7 days, please contact the clinic through [employer] or phone. If you have a critical or abnormal lab result, we will notify you by phone as soon as possible.  Submit refill requests through [employer] or call your pharmacy and they will forward the refill request to us. Please allow 3 business days for your refill to be completed.          Additional Information About Your Visit        [employer] Information     [employer] gives you secure access to your electronic health record. If you see a primary care provider, you can also send messages to your care team and  make appointments. If you have questions, please call your primary care clinic.  If you do not have a primary care provider, please call [phone] and they will assist you.        Care EveryWhere ID     This is your Care EveryWhere ID. This could be used by other organizations to access your [facility] medical records  [id number]         Blood Pressure from Last 3 Encounters:   06/07/17 112/80   05/26/17 122/74   05/24/17 106/80    Weight from Last 3 Encounters:   06/07/17 217 lb (98.4 kg)   05/26/17 220 lb (99.8 kg)   05/24/17 220 lb (99.8 kg)              Today, you had the following     No orders found for display       Primary Care Provider Office Phone # Fax #    Diogo Remi Chiu [phone]       [facility] [address]  [facility] [address]        Equal Access to Services     ALEXIA AGUILAR : Hadii aad ku hadasho Soomaali, waaxda luqadaha, qaybta kaalmada adeegyada, waxay idiin haytristann ce chavez . So [facility] [phone].    ATENCIÓN: Si habla español, tiene a garcia disposición servicios gratuitos de asistencia lingüística. Yuejailene al [phone].    We comply with applicable federal civil rights laws and [place] laws. We do not discriminate on the basis of race, color, national origin, age, disability sex, sexual orientation or gender identity.            Thank you!     Thank you for choosing [facility] ORTHOPEDIC SURGERY  for your care. Our goal is always to provide you with excellent care. Hearing back from our patients is one way we can continue to improve our services. Please take a few minutes to complete the written survey that you may receive in the mail after your visit with us. Thank you!             Your Updated Medication List - Protect others around you: Learn how to safely use, store and throw away your medicines at www.disposemymeds.org.          This list is accurate as of: 6/26/17  3:41 PM.  Always use your most recent med list.                    Brand Name Dispense Instructions for use Diagnosis    finasteride 1 MG tablet    PROPECIA    30 tablet    Take 1 tablet (1 mg) by mouth daily        IBUPROFEN PO           MULTIVITAMIN ADULT PO           PARoxetine 10 MG tablet    PAXIL    90 tablet    Take 0.5-1 tablets (5-10 mg) by mouth At Bedtime    Premature ejaculation

## 2018-01-09 DIAGNOSIS — F52.4 PREMATURE EJACULATION: ICD-10-CM

## 2018-01-09 RX ORDER — PAROXETINE 10 MG/1
10-20 TABLET, FILM COATED ORAL EVERY MORNING
Qty: 90 TABLET | Refills: 1 | Status: SHIPPED | OUTPATIENT
Start: 2018-01-09 | End: 2018-09-26

## 2018-01-09 ASSESSMENT — PATIENT HEALTH QUESTIONNAIRE - PHQ9: SUM OF ALL RESPONSES TO PHQ QUESTIONS 1-9: 0

## 2018-01-09 NOTE — TELEPHONE ENCOUNTER
Disp Refills Start End KENNEDY   PARoxetine (PAXIL) 10 MG tablet 90 tablet 1 12/20/2016  No   Sig: Take 0.5-1 tablets (5-10 mg) by mouth At Bedtime   Class: E-Prescribe     12/20/16 1:34 PM   Wayne,     I didn't change the instructions on the paroxetine.  However, they are 10mg tabs, and you can take two.  If this works with no side effects I can alter the script going forward.     Have a [facility].     Diogo Chiu MD      Call out to pt, states he take 1 or 2 tabs depending on the time of year. PHQ 9 updated  PHQ-9 SCORE 6/7/2017 1/9/2018   Total Score 0 0     Routing refill request to provider for review/approval because:  Change in directions needs to be updated, states he can only get 30 days rx at a time, but would like more than 30 tabs per month    Lanie Al RN, BS  Clinical Nurse Triage.

## 2018-01-09 NOTE — TELEPHONE ENCOUNTER
"Requested Prescriptions   Pending Prescriptions Disp Refills     PARoxetine (PAXIL) 10 MG tablet [Pharmacy Med Name: PAROXETINE 10MG     TAB] 30 tablet 5    Last Written Prescription Date:  12/20/16  Last Fill Quantity: 90,  # refills: 1   Last Office Visit with [facility], [facility] or [facility] prescribing provider:  6/7/2017   Future Office Visit:      Sig: TAKE ONE-HALF TO ONE TABLET BY MOUTH AT BEDTIME    SSRIs Protocol Passed    PHQ-9 SCORE 6/7/2017   Total Score 0     BEN-7 SCORE 6/7/2017   Total Score 0          Passed - Recent or future visit with authorizing provider    Patient had office visit in the last year or has a visit in the next 30 days with authorizing provider.  See \"Patient Info\" tab in inbasket, or \"Choose Columns\" in Meds & Orders section of the refill encounter.              Passed - Patient is age 18 or older          "

## 2018-03-02 ENCOUNTER — OFFICE VISIT (OUTPATIENT)
Dept: FAMILY MEDICINE | Facility: CLINIC | Age: 43
End: 2018-03-02
Payer: COMMERCIAL

## 2018-03-02 VITALS
BODY MASS INDEX: 28.76 KG/M2 | DIASTOLIC BLOOD PRESSURE: 80 MMHG | WEIGHT: 217 LBS | RESPIRATION RATE: 14 BRPM | HEIGHT: 73 IN | HEART RATE: 66 BPM | SYSTOLIC BLOOD PRESSURE: 108 MMHG | TEMPERATURE: 98 F

## 2018-03-02 DIAGNOSIS — L71.9 ROSACEA: ICD-10-CM

## 2018-03-02 DIAGNOSIS — R10.13 EPIGASTRIC PAIN: Primary | ICD-10-CM

## 2018-03-02 DIAGNOSIS — B76.9 CUTANEOUS LARVA MIGRANS: ICD-10-CM

## 2018-03-02 PROCEDURE — 99214 OFFICE O/P EST MOD 30 MIN: CPT | Performed by: PHYSICIAN ASSISTANT

## 2018-03-02 PROCEDURE — 87506 IADNA-DNA/RNA PROBE TQ 6-11: CPT | Performed by: PHYSICIAN ASSISTANT

## 2018-03-02 RX ORDER — METRONIDAZOLE 10 MG/G
GEL TOPICAL DAILY
Qty: 60 G | Refills: 11 | Status: SHIPPED | OUTPATIENT
Start: 2018-03-02 | End: 2018-09-26

## 2018-03-02 RX ORDER — IVERMECTIN 3 MG/1
3 TABLET ORAL ONCE
Qty: 6 TABLET | Refills: 0 | Status: SHIPPED | OUTPATIENT
Start: 2018-03-02 | End: 2018-03-02

## 2018-03-02 RX ORDER — ALBENDAZOLE 200 MG/1
400 TABLET, FILM COATED ORAL
Qty: 6 TABLET | Refills: 0 | Status: SHIPPED | OUTPATIENT
Start: 2018-03-02 | End: 2018-03-02

## 2018-03-02 RX ORDER — METRONIDAZOLE 10 MG/G
GEL TOPICAL DAILY
Qty: 60 G | Refills: 11 | Status: SHIPPED | OUTPATIENT
Start: 2018-03-02 | End: 2018-03-02

## 2018-03-02 NOTE — NURSING NOTE
"Chief Complaint   Patient presents with     Derm Problem     Recently returned from trip to [place], has rash on bilateral feet      Abdominal Pain     Intermitent diarrhea, usually right after eating X 6 days       Initial /80 (BP Location: Right arm, Patient Position: Chair, Cuff Size: Adult Large)  Pulse 66  Temp 98  F (36.7  C) (Oral)  Resp 14  Ht 6' 1\" (1.854 m)  Wt 217 lb (98.4 kg)  BMI 28.63 kg/m2 Estimated body mass index is 28.63 kg/(m^2) as calculated from the following:    Height as of this encounter: 6' 1\" (1.854 m).    Weight as of this encounter: 217 lb (98.4 kg).  Medication Reconciliation: complete   Heron Rhodes MA      "

## 2018-03-02 NOTE — PROGRESS NOTES
"  SUBJECTIVE:   Wayne Lam is a 42 year old male who presents to clinic today for the following health issues:      ABDOMINAL PAIN     Onset: X 6 days     Description:   Character: Sharp  Location: epigastric region and \"center\"   Radiation: None    Intensity: moderate    Progression of Symptoms:  same    Accompanying Signs & Symptoms:  Fever/Chills?: no   Gas/Bloating: YES  Nausea: no   Vomitting: no   Diarrhea?: YES  Constipation:no   Dysuria or Hematuria: no    History:   Trauma: no   Previous similar pain: no    Previous tests done: none    Precipitating factors:   Does the pain change with:     Food: YES     BM: YES    Urination: no     Alleviating factors:  none    Therapies Tried and outcome: none       Rash  Onset: X 2 days     Description:   Location: Bilateral feet   Character: raised, painful, red, red lines  Itching (Pruritis): YES    Progression of Symptoms:  worsening    Accompanying Signs & Symptoms:  Fever: no   Body aches or joint pain: no   Sore throat symptoms: no   Recent cold symptoms: no     History:   Previous similar rash: YES- has had athletes foot before but not sure if this is that     Precipitating factors:   Exposure to similar rash: no   New exposures: None   Recent travel: YES- [place]     Alleviating factors:  none    Therapies Tried and outcome: Tried athletes foot cream last night before bed, no improvement    Problem list and histories reviewed & adjusted, as indicated.  Additional history: as documented      Reviewed and updated as needed this visit by clinical staff  Tobacco  Allergies  Meds  Med Hx  Surg Hx  Fam Hx  Soc Hx      ROS:  Constitutional, HEENT, cardiovascular, pulmonary, gi and gu systems are negative, except as otherwise noted.    OBJECTIVE:     /80 (BP Location: Right arm, Patient Position: Chair, Cuff Size: Adult Large)  Pulse 66  Temp 98  F (36.7  C) (Oral)  Resp 14  Ht 6' 1\" (1.854 m)  Wt 217 lb (98.4 kg)  BMI 28.63 kg/m2  Body mass index is " 28.63 kg/(m^2).  GENERAL: healthy, alert and no distress  HENT: oropharynx clear and oral mucous membranes moist  NECK: no adenopathy, no asymmetry, masses, or scars and thyroid normal to palpation  RESP: lungs clear to auscultation - no rales, rhonchi or wheezes  CV: regular rate and rhythm, normal S1 S2, no S3 or S4, no murmur, click or rub, no peripheral edema and peripheral pulses strong  ABDOMEN: soft, nontender, no hepatosplenomegaly, no masses and bowel sounds normal  SKIN: Serpiginous erythematous lesions bilat feet.  Erythematous oily skin on nose and cheeks c/w acne rosacea.     Diagnostic Test Results:  none     ASSESSMENT/PLAN:   1. Epigastric pain  - Enteric Bacteria and Virus Panel by MARIA DEL CARMEN Stool; Future  - ranitidine (ZANTAC) 300 MG tablet; Take 1 tablet (300 mg) by mouth At Bedtime  Dispense: 30 tablet; Refill: 1    2. Cutaneous larva migrans  - albendazole (ALBENZA) 200 MG TABS tablet; Take 2 tablets (400 mg) by mouth daily with food  Dispense: 6 tablet; Refill: 0    3. Rosacea  - metroNIDAZOLE (METROGEL) 1 % gel; Apply topically daily  Dispense: 60 g; Refill: 11    Use medication as directed.  Follow up on labs  Follow up if symptoms should persist, change or worsen.  Patient amenable to this follow up plan.     Melanie Mcclellan PA-C  [facility]

## 2018-03-02 NOTE — TELEPHONE ENCOUNTER
Pt calls, waiting at [facility]'s for rx's, [facility] did not have 2 of them, [facility] aware, had to change one of the prescriptions, pharmacy informs [facility] aware, routed and will give [facility] message to send asap as pt there  Aliyah Reeder, RN, BSN  Message handled by Nurse Triage.

## 2018-03-02 NOTE — MR AVS SNAPSHOT
After Visit Summary   3/2/2018    Wayne Lam    MRN: [id number]           Patient Information     Date Of Birth          1975        Visit Information        Provider Department      3/2/2018 10:20 AM Melanie Mcclellan PA-C [facility]        Today's Diagnoses     Epigastric pain    -  1    Cutaneous larva migrans        Rosacea           Follow-ups after your visit        Future tests that were ordered for you today     Open Future Orders        Priority Expected Expires Ordered    Enteric Bacteria and Virus Panel by MARIA DEL CARMEN Stool Routine  3/2/2019 3/2/2018            Who to contact     If you have questions or need follow up information about today's clinic visit or your schedule please contact [facility] directly at [phone].  Normal or non-critical lab and imaging results will be communicated to you by [employer], letter or phone within 4 business days after the clinic has received the results. If you do not hear from us within 7 days, please contact the clinic through [employer] or phone. If you have a critical or abnormal lab result, we will notify you by phone as soon as possible.  Submit refill requests through [employer] or call your pharmacy and they will forward the refill request to us. Please allow 3 business days for your refill to be completed.          Additional Information About Your Visit        MyChart Information     [employer] gives you secure access to your electronic health record. If you see a primary care provider, you can also send messages to your care team and make appointments. If you have questions, please call your primary care clinic.  If you do not have a primary care provider, please call [phone] and they will assist you.        Care EveryWhere ID     This is your Care EveryWhere ID. This could be used by other organizations to access your [facility] medical records  [id number]        Your Vitals Were     Pulse Temperature  "Respirations Height BMI (Body Mass Index)       66 98  F (36.7  C) (Oral) 14 6' 1\" (1.854 m) 28.63 kg/m2        Blood Pressure from Last 3 Encounters:   03/02/18 108/80   06/07/17 112/80   05/26/17 122/74    Weight from Last 3 Encounters:   03/02/18 217 lb (98.4 kg)   06/07/17 217 lb (98.4 kg)   05/26/17 220 lb (99.8 kg)                 Today's Medication Changes          These changes are accurate as of 3/2/18 11:10 AM.  If you have any questions, ask your nurse or doctor.               Start taking these medicines.        Dose/Directions    albendazole 200 MG Tabs tablet   Commonly known as:  ALBENZA   Used for:  Cutaneous larva migrans   Started by:  Melanie Mcclellan PA-C        Dose:  400 mg   Take 2 tablets (400 mg) by mouth daily with food   Quantity:  6 tablet   Refills:  0       metroNIDAZOLE 1 % gel   Commonly known as:  METROGEL   Used for:  Rosacea   Started by:  Melanie Mcclellan PA-C        Apply topically daily   Quantity:  60 g   Refills:  11       ranitidine 300 MG tablet   Commonly known as:  ZANTAC   Used for:  Epigastric pain   Started by:  Melanie Mcclellan PA-C        Dose:  300 mg   Take 1 tablet (300 mg) by mouth At Bedtime   Quantity:  30 tablet   Refills:  1            Where to get your medicines      These medications were sent to [facility] [address]  [address] [facility] [address]     Phone:  [phone]     albendazole 200 MG Tabs tablet    metroNIDAZOLE 1 % gel    ranitidine 300 MG tablet                Primary Care Provider Office Phone # Fax #    Diogo Chiu [phone]       [id number]  [address] [facility] [address]        Equal Access to Services     EDMAR AGUILAR AH: Korey Cain, wajose alejandroda luqadaha, qaybta kaalmada carly, von figueroa. [facility] [phone].    ATENCIÓN: Si habla domingoañol, tiene a garcia disposición servicios gratuitos de " asistencia lingüística. Jeff al [phone].    We comply with applicable federal civil rights laws and [place] laws. We do not discriminate on the basis of race, color, national origin, age, disability, sex, sexual orientation, or gender identity.            Thank you!     Thank you for choosing [facility]  for your care. Our goal is always to provide you with excellent care. Hearing back from our patients is one way we can continue to improve our services. Please take a few minutes to complete the written survey that you may receive in the mail after your visit with us. Thank you!             Your Updated Medication List - Protect others around you: Learn how to safely use, store and throw away your medicines at www.disposemymeds.org.          This list is accurate as of 3/2/18 11:10 AM.  Always use your most recent med list.                   Brand Name Dispense Instructions for use Diagnosis    albendazole 200 MG Tabs tablet    ALBENZA    6 tablet    Take 2 tablets (400 mg) by mouth daily with food    Cutaneous larva migrans       finasteride 1 MG tablet    PROPECIA    30 tablet    Take 1 tablet (1 mg) by mouth daily        IBUPROFEN PO           metroNIDAZOLE 1 % gel    METROGEL    60 g    Apply topically daily    Rosacea       MULTIVITAMIN ADULT PO           PARoxetine 10 MG tablet    PAXIL    90 tablet    Take 1-2 tablets (10-20 mg) by mouth every morning    Premature ejaculation       ranitidine 300 MG tablet    ZANTAC    30 tablet    Take 1 tablet (300 mg) by mouth At Bedtime    Epigastric pain

## 2018-03-05 ENCOUNTER — MYC MEDICAL ADVICE (OUTPATIENT)
Dept: FAMILY MEDICINE | Facility: CLINIC | Age: 43
End: 2018-03-05

## 2018-08-06 ENCOUNTER — TRANSFERRED RECORDS (OUTPATIENT)
Dept: HEALTH INFORMATION MANAGEMENT | Facility: CLINIC | Age: 43
End: 2018-08-06

## 2018-09-25 ENCOUNTER — NURSE TRIAGE (OUTPATIENT)
Dept: NURSING | Facility: CLINIC | Age: 43
End: 2018-09-25

## 2018-09-26 ENCOUNTER — OFFICE VISIT (OUTPATIENT)
Dept: FAMILY MEDICINE | Facility: CLINIC | Age: 43
End: 2018-09-26
Payer: COMMERCIAL

## 2018-09-26 VITALS
RESPIRATION RATE: 16 BRPM | DIASTOLIC BLOOD PRESSURE: 70 MMHG | BODY MASS INDEX: 29.34 KG/M2 | TEMPERATURE: 98.3 F | WEIGHT: 222.4 LBS | HEART RATE: 64 BPM | SYSTOLIC BLOOD PRESSURE: 106 MMHG

## 2018-09-26 DIAGNOSIS — Z00.00 ROUTINE GENERAL MEDICAL EXAMINATION AT A HEALTH CARE FACILITY: Primary | ICD-10-CM

## 2018-09-26 DIAGNOSIS — H01.002 BLEPHARITIS OF RIGHT LOWER EYELID, UNSPECIFIED TYPE: ICD-10-CM

## 2018-09-26 DIAGNOSIS — B30.9 ACUTE VIRAL CONJUNCTIVITIS OF RIGHT EYE: ICD-10-CM

## 2018-09-26 DIAGNOSIS — L65.9 ALOPECIA OF SCALP: ICD-10-CM

## 2018-09-26 DIAGNOSIS — S80.11XS HEMATOMA OF LEG, RIGHT, SEQUELA: ICD-10-CM

## 2018-09-26 PROCEDURE — 36415 COLL VENOUS BLD VENIPUNCTURE: CPT | Performed by: FAMILY MEDICINE

## 2018-09-26 PROCEDURE — 99396 PREV VISIT EST AGE 40-64: CPT | Performed by: FAMILY MEDICINE

## 2018-09-26 PROCEDURE — 80061 LIPID PANEL: CPT | Performed by: FAMILY MEDICINE

## 2018-09-26 PROCEDURE — 80048 BASIC METABOLIC PNL TOTAL CA: CPT | Performed by: FAMILY MEDICINE

## 2018-09-26 PROCEDURE — 99213 OFFICE O/P EST LOW 20 MIN: CPT | Mod: 25 | Performed by: FAMILY MEDICINE

## 2018-09-26 RX ORDER — FINASTERIDE 1 MG/1
1 TABLET, FILM COATED ORAL DAILY
Qty: 90 TABLET | Refills: 3 | Status: SHIPPED | OUTPATIENT
Start: 2018-09-26 | End: 2019-10-08

## 2018-09-26 NOTE — PROGRESS NOTES
"  SUBJECTIVE:   CC: Wayne Lam is an 42 year old male who presents for preventative health visit.     Healthy Habits:  Answers for HPI/ROS submitted by the patient on 9/26/2018   Annual Exam:  Getting at least 3 servings of Calcium per day:: Yes  Bi-annual eye exam:: NO  Dental care twice a year:: Yes  Sleep apnea or symptoms of sleep apnea:: Excessive snoring  Diet:: Regular (no restrictions)  Frequency of exercise:: None  Taking medications regularly:: Yes  Medication side effects:: None  Additional concerns today:: YES  PHQ-2 Score: 0    Has been using Finstaride to help combat hair loss.  Has been prescribed by another MD who is no longer available.  Has been using for about 8 years.  No issues with side effects.    Lump on leg, linked with injury that also caused a broken ankle about 6 weeks ago.  Was inially \"4-5 times as big\".  Non tender, able to walk without difficulty now.    Pink eye, R, started yesterday.  This is third time this year, feels that it's along the lower eyelashes, gets some mattering along there.  Will be mattered shut in the am.  No cold or flu sx.  Does have abx eye drops from a previous occurrence, isn't sure they help much.    Today's PHQ-2 Score:   PHQ-2 ( 1999 Pfizer) 9/26/2018 6/7/2017   Q1: Little interest or pleasure in doing things 0 0   Q2: Feeling down, depressed or hopeless 0 0   PHQ-2 Score 0 0   Q1: Little interest or pleasure in doing things Not at all -   Q2: Feeling down, depressed or hopeless Not at all -   PHQ-2 Score 0 -     Abuse: Current or Past(Physical, Sexual or Emotional)- No  Do you feel safe in your environment - Yes    Social History   Substance Use Topics     Smoking status: Former Smoker     Types: Cigarettes     Smokeless tobacco: Never Used      Comment: 2 packs/week     Alcohol use 0.0 oz/week     0 Standard drinks or equivalent per week      Comment: occas      If you drink alcohol do you typically have >3 drinks per day or >7 drinks per week? No        "               Last PSA: No results found for: PSA    Reviewed orders with patient. Reviewed health maintenance and updated orders accordingly - Yes    Reviewed and updated as needed this visit by clinical staff  Tobacco  Allergies  Meds  Med Hx  Surg Hx  Fam Hx  Soc Hx        Reviewed and updated as needed this visit by Provider            ROS:  CONSTITUTIONAL: NEGATIVE for fever, chills, change in weight  INTEGUMENTARY/SKIN: NEGATIVE for worrisome rashes, moles or lesions  EYES: NEGATIVE for vision changes or irritation  ENT: NEGATIVE for ear, mouth and throat problems  RESP: NEGATIVE for significant cough or SOB  CV: NEGATIVE for chest pain, palpitations or peripheral edema  GI: NEGATIVE for nausea, abdominal pain, heartburn, or change in bowel habits   male: negative for dysuria, hematuria, decreased urinary stream, erectile dysfunction, urethral discharge  MUSCULOSKELETAL: NEGATIVE for significant arthralgias or myalgia  NEURO: NEGATIVE for weakness, dizziness or paresthesias  PSYCHIATRIC: NEGATIVE for changes in mood or affect  Except as noted in HPI    OBJECTIVE:   /70 (BP Location: Right arm, Patient Position: Chair, Cuff Size: Adult Regular)  Pulse 64  Temp 98.3  F (36.8  C) (Oral)  Resp 16  Wt 222 lb 6.4 oz (100.9 kg)  BMI 29.34 kg/m2  EXAM:  GENERAL: healthy, alert and no distress  EYES: Eyes grossly normal to inspection and conjunctiva/corneas- conjunctival injection OS and lower lid blepheritis  HENT: ear canals and TM's normal, nose and mouth without ulcers or lesions  NECK: no adenopathy, no asymmetry, masses, or scars and thyroid normal to palpation  RESP: lungs clear to auscultation - no rales, rhonchi or wheezes  CV: regular rate and rhythm, normal S1 S2, no S3 or S4, no murmur, click or rub, no peripheral edema and peripheral pulses strong  ABDOMEN: soft, nontender, no hepatosplenomegaly, no masses and bowel sounds normal  MS: no gross musculoskeletal defects noted, no edema  MS:  "3.5x6 cm hematoma along proximal L tibia  SKIN: no suspicious lesions or rashes  NEURO: Normal strength and tone, mentation intact and speech normal  PSYCH: mentation appears normal, affect normal/bright    Diagnostic Test Results:  none     ASSESSMENT/PLAN:   1. Routine general medical examination at a health care facility    - Lipid panel reflex to direct LDL Fasting  - Basic metabolic panel    2. Alopecia of scalp   will order, has been using for several years  - finasteride (PROPECIA) 1 MG tablet; Take 1 tablet (1 mg) by mouth daily  Dispense: 90 tablet; Refill: 3  - OFFICE/OUTPT VISIT,EST,LEVL III    3. Acute viral conjunctivitis of right eye  Eye wash, may use previous drops PRN  - OFFICE/OUTPT VISIT,EST,LEVL III    4. Hematoma of leg, right, sequela  Observe, impriving  - OFFICE/OUTPT VISIT,EST,LEVL III    5. Blepharitis of right lower eyelid, unspecified type        COUNSELING:  Reviewed preventive health counseling, as reflected in patient instructions    BP Readings from Last 1 Encounters:   09/26/18 106/70     Estimated body mass index is 29.34 kg/(m^2) as calculated from the following:    Height as of 3/2/18: 6' 1\" (1.854 m).    Weight as of this encounter: 222 lb 6.4 oz (100.9 kg).      Weight management plan: Discussed healthy diet and exercise guidelines and patient will follow up in 12 months in clinic to re-evaluate.     reports that he has quit smoking. His smoking use included Cigarettes. He has never used smokeless tobacco.      Counseling Resources:  ATP IV Guidelines  Pooled Cohorts Equation Calculator  FRAX Risk Assessment  ICSI Preventive Guidelines  Dietary Guidelines for Americans, 2010  USDA's MyPlate  ASA Prophylaxis  Lung CA Screening    Diogo Chiu MD  [facility]  "

## 2018-09-26 NOTE — MR AVS SNAPSHOT
After Visit Summary   9/26/2018    Wayne Lam    MRN: [id number]           Patient Information     Date Of Birth          1975        Visit Information        Provider Department      9/26/2018 9:20 AM Diogo Chiu MD [facility]        Today's Diagnoses     Routine general medical examination at a health care facility    -  1    Alopecia of scalp        Acute viral conjunctivitis of right eye        Hematoma of leg, right, sequela        Blepharitis of right lower eyelid, unspecified type          Care Instructions      Preventive Health Recommendations  Male Ages 40 to 49    Yearly exam:             See your health care provider every year in order to  o   Review health changes.   o   Discuss preventive care.    o   Review your medicines if your doctor has prescribed any.    You should be tested each year for STDs (sexually transmitted diseases) if you re at risk.     Have a cholesterol test every 5 years.     Have a colonoscopy (test for colon cancer) if someone in your family has had colon cancer or polyps before age 50.     After age 45, have a diabetes test (fasting glucose). If you are at risk for diabetes, you should have this test every 3 years.      Talk with your health care provider about whether or not a prostate cancer screening test (PSA) is right for you.    Shots: Get a flu shot each year. Get a tetanus shot every 10 years.     Nutrition:    Eat at least 5 servings of fruits and vegetables daily.     Eat whole-grain bread, whole-wheat pasta and brown rice instead of white grains and rice.     Get adequate Calcium and Vitamin D.     Lifestyle    Exercise for at least 150 minutes a week (30 minutes a day, 5 days a week). This will help you control your weight and prevent disease.     Limit alcohol to one drink per day.     No smoking.     Wear sunscreen to prevent skin cancer.     See your dentist every six months for an exam and cleaning.               Follow-ups after your visit        Follow-up notes from your care team     Return in about 1 year (around 9/26/2019).      Who to contact     If you have questions or need follow up information about today's clinic visit or your schedule please contact [facility] directly at [phone].  Normal or non-critical lab and imaging results will be communicated to you by MyChart, letter or phone within 4 business days after the clinic has received the results. If you do not hear from us within 7 days, please contact the clinic through [employer] or phone. If you have a critical or abnormal lab result, we will notify you by phone as soon as possible.  Submit refill requests through [employer] or call your pharmacy and they will forward the refill request to us. Please allow 3 business days for your refill to be completed.          Additional Information About Your Visit        MyChart Information     [employer] gives you secure access to your electronic health record. If you see a primary care provider, you can also send messages to your care team and make appointments. If you have questions, please call your primary care clinic.  If you do not have a primary care provider, please call [phone] and they will assist you.        Care EveryWhere ID     This is your Care EveryWhere ID. This could be used by other organizations to access your [place] medical records  [id number]        Your Vitals Were     Pulse Temperature Respirations BMI (Body Mass Index)          64 98.3  F (36.8  C) (Oral) 16 29.34 kg/m2         Blood Pressure from Last 3 Encounters:   09/26/18 106/70   03/02/18 108/80   06/07/17 112/80    Weight from Last 3 Encounters:   09/26/18 222 lb 6.4 oz (100.9 kg)   03/02/18 217 lb (98.4 kg)   06/07/17 217 lb (98.4 kg)              We Performed the Following     Basic metabolic panel     Lipid panel reflex to direct LDL Fasting     OFFICE/OUTPT VISIT,EST,LEVL III          Today's Medication Changes           These changes are accurate as of 9/26/18 10:05 AM.  If you have any questions, ask your nurse or doctor.               These medicines have changed or have updated prescriptions.        Dose/Directions    * finasteride 1 MG tablet   Commonly known as:  PROPECIA   This may have changed:  Another medication with the same name was added. Make sure you understand how and when to take each.   Changed by:  Diogo Chiu MD        Dose:  1 mg   Take 1 tablet (1 mg) by mouth daily   Quantity:  30 tablet   Refills:  1       * finasteride 1 MG tablet   Commonly known as:  PROPECIA   This may have changed:  You were already taking a medication with the same name, and this prescription was added. Make sure you understand how and when to take each.   Used for:  Alopecia of scalp   Changed by:  Diogo Chiu MD        Dose:  1 mg   Take 1 tablet (1 mg) by mouth daily   Quantity:  90 tablet   Refills:  3       * Notice:  This list has 2 medication(s) that are the same as other medications prescribed for you. Read the directions carefully, and ask your doctor or other care provider to review them with you.         Where to get your medicines      These medications were sent to [facility]/pharmacy #[id number] - [place], [place] - [address]  [address] [facility] [address]     Phone:  [phone]     finasteride 1 MG tablet                Primary Care Provider Office Phone # Fax #    Diogo Chiu [phone]       [id number]  [address] [facility] [address]        Equal Access to Services     [facility] AH: Hadii jenny cohen hadasho Solawrenceali, waaxda luqadaha, qaybta kaalmada adeegyada, von figueroa. So [facility] [phone].    ATENCIÓN: Si habla español, tiene a garcia disposición servicios gratuitos de asistencia lingüística. Llame al [phone].    We comply with applicable federal civil rights laws and [place] laws. We do not discriminate on the basis of  race, color, national origin, age, disability, sex, sexual orientation, or gender identity.            Thank you!     Thank you for choosing [facility]  for your care. Our goal is always to provide you with excellent care. Hearing back from our patients is one way we can continue to improve our services. Please take a few minutes to complete the written survey that you may receive in the mail after your visit with us. Thank you!             Your Updated Medication List - Protect others around you: Learn how to safely use, store and throw away your medicines at www.disposemymeds.org.          This list is accurate as of 9/26/18 10:05 AM.  Always use your most recent med list.                   Brand Name Dispense Instructions for use Diagnosis    * finasteride 1 MG tablet    PROPECIA    30 tablet    Take 1 tablet (1 mg) by mouth daily        * finasteride 1 MG tablet    PROPECIA    90 tablet    Take 1 tablet (1 mg) by mouth daily    Alopecia of scalp       IBUPROFEN PO           MULTIVITAMIN ADULT PO           ranitidine 300 MG tablet    ZANTAC    30 tablet    Take 1 tablet (300 mg) by mouth At Bedtime    Epigastric pain       * Notice:  This list has 2 medication(s) that are the same as other medications prescribed for you. Read the directions carefully, and ask your doctor or other care provider to review them with you.

## 2018-09-27 LAB
ANION GAP SERPL CALCULATED.3IONS-SCNC: 9 MMOL/L (ref 3–14)
BUN SERPL-MCNC: 15 MG/DL (ref 7–30)
CALCIUM SERPL-MCNC: 8.8 MG/DL (ref 8.5–10.1)
CHLORIDE SERPL-SCNC: 107 MMOL/L (ref 94–109)
CHOLEST SERPL-MCNC: 169 MG/DL
CO2 SERPL-SCNC: 25 MMOL/L (ref 20–32)
CREAT SERPL-MCNC: 1.31 MG/DL (ref 0.66–1.25)
GFR SERPL CREATININE-BSD FRML MDRD: 60 ML/MIN/1.7M2
GLUCOSE SERPL-MCNC: 65 MG/DL (ref 70–99)
HDLC SERPL-MCNC: 49 MG/DL
LDLC SERPL CALC-MCNC: 73 MG/DL
NONHDLC SERPL-MCNC: 120 MG/DL
POTASSIUM SERPL-SCNC: 4.5 MMOL/L (ref 3.4–5.3)
SODIUM SERPL-SCNC: 141 MMOL/L (ref 133–144)
TRIGL SERPL-MCNC: 233 MG/DL

## 2018-10-01 PROBLEM — N18.2 CRF (CHRONIC RENAL FAILURE), STAGE 2 (MILD): Status: ACTIVE | Noted: 2018-10-01

## 2019-10-08 DIAGNOSIS — L65.9 ALOPECIA OF SCALP: ICD-10-CM

## 2019-10-08 NOTE — TELEPHONE ENCOUNTER
"Requested Prescriptions   Pending Prescriptions Disp Refills     finasteride (PROPECIA) 1 MG tablet [Pharmacy Med Name: FINASTERIDE 1MG     TAB] 90 tablet 3     Sig: TAKE 1 TABLET BY MOUTH ONCE DAILY   Last Written Prescription Date:  9/26/18  Last Fill Quantity: 90,  # refills: 3   Last Office Visit: 9/26/2018   Future Office Visit:         Miscellaneous Dermatologic Agents Failed - 10/8/2019  7:44 AM        Failed - Recent (12 mo) or future (30 days) visit within the authorizing provider's specialty     Patient has had an office visit with the authorizing provider or a provider within the authorizing providers department within the previous 12 mos or has a future within next 30 days. See \"Patient Info\" tab in inbasket, or \"Choose Columns\" in Meds & Orders section of the refill encounter.              Failed - Refill request is not for Imiquimod, 5-Fluorouracil, or Finasteride      If Imiquimod, 5-Fluorouracil, or Finasteride, may refill if indicated in progress notes.           Passed - Medication is active on med list        Passed - Patient is 24 mos old or older        "

## 2019-10-09 RX ORDER — FINASTERIDE 1 MG/1
TABLET, FILM COATED ORAL
Qty: 30 TABLET | Refills: 0 | Status: SHIPPED | OUTPATIENT
Start: 2019-10-09 | End: 2019-10-17

## 2019-10-09 NOTE — TELEPHONE ENCOUNTER
Pt scheduled for 10/17    Medication is being filled for 1 time refill only due to:  Patient needs to be seen because it has been more than one year since last visit.

## 2019-10-17 ENCOUNTER — OFFICE VISIT (OUTPATIENT)
Dept: FAMILY MEDICINE | Facility: CLINIC | Age: 44
End: 2019-10-17
Payer: COMMERCIAL

## 2019-10-17 VITALS
HEIGHT: 73 IN | DIASTOLIC BLOOD PRESSURE: 78 MMHG | WEIGHT: 220 LBS | RESPIRATION RATE: 16 BRPM | TEMPERATURE: 97.9 F | BODY MASS INDEX: 29.16 KG/M2 | HEART RATE: 75 BPM | SYSTOLIC BLOOD PRESSURE: 112 MMHG | OXYGEN SATURATION: 99 %

## 2019-10-17 DIAGNOSIS — Z00.00 ROUTINE GENERAL MEDICAL EXAMINATION AT A HEALTH CARE FACILITY: Primary | ICD-10-CM

## 2019-10-17 DIAGNOSIS — L65.9 ALOPECIA OF SCALP: ICD-10-CM

## 2019-10-17 PROCEDURE — 80048 BASIC METABOLIC PNL TOTAL CA: CPT | Performed by: FAMILY MEDICINE

## 2019-10-17 PROCEDURE — 90471 IMMUNIZATION ADMIN: CPT | Performed by: FAMILY MEDICINE

## 2019-10-17 PROCEDURE — 80061 LIPID PANEL: CPT | Performed by: FAMILY MEDICINE

## 2019-10-17 PROCEDURE — 36415 COLL VENOUS BLD VENIPUNCTURE: CPT | Performed by: FAMILY MEDICINE

## 2019-10-17 PROCEDURE — 99396 PREV VISIT EST AGE 40-64: CPT | Mod: 25 | Performed by: FAMILY MEDICINE

## 2019-10-17 PROCEDURE — 90686 IIV4 VACC NO PRSV 0.5 ML IM: CPT | Performed by: FAMILY MEDICINE

## 2019-10-17 RX ORDER — FINASTERIDE 1 MG/1
1 TABLET, FILM COATED ORAL DAILY
Qty: 90 TABLET | Refills: 1 | Status: SHIPPED | OUTPATIENT
Start: 2019-10-17 | End: 2020-05-11

## 2019-10-17 ASSESSMENT — ENCOUNTER SYMPTOMS
COUGH: 0
HEADACHES: 0
CONSTIPATION: 0
NERVOUS/ANXIOUS: 0
ABDOMINAL PAIN: 0
CHILLS: 0
DIARRHEA: 0
FREQUENCY: 0
HEMATOCHEZIA: 0
FEVER: 0
HEMATURIA: 0
DIZZINESS: 0
EYE PAIN: 0

## 2019-10-17 ASSESSMENT — ANXIETY QUESTIONNAIRES
7. FEELING AFRAID AS IF SOMETHING AWFUL MIGHT HAPPEN: NOT AT ALL
1. FEELING NERVOUS, ANXIOUS, OR ON EDGE: NOT AT ALL
3. WORRYING TOO MUCH ABOUT DIFFERENT THINGS: NOT AT ALL
6. BECOMING EASILY ANNOYED OR IRRITABLE: NOT AT ALL
5. BEING SO RESTLESS THAT IT IS HARD TO SIT STILL: NOT AT ALL
2. NOT BEING ABLE TO STOP OR CONTROL WORRYING: NOT AT ALL
IF YOU CHECKED OFF ANY PROBLEMS ON THIS QUESTIONNAIRE, HOW DIFFICULT HAVE THESE PROBLEMS MADE IT FOR YOU TO DO YOUR WORK, TAKE CARE OF THINGS AT HOME, OR GET ALONG WITH OTHER PEOPLE: NOT DIFFICULT AT ALL
GAD7 TOTAL SCORE: 0

## 2019-10-17 ASSESSMENT — PATIENT HEALTH QUESTIONNAIRE - PHQ9
SUM OF ALL RESPONSES TO PHQ QUESTIONS 1-9: 0
5. POOR APPETITE OR OVEREATING: NOT AT ALL

## 2019-10-17 ASSESSMENT — MIFFLIN-ST. JEOR: SCORE: 1937.82

## 2019-10-17 NOTE — PROGRESS NOTES
SUBJECTIVE:   CC: Wayne Lma is an 44 year old male who presents for preventative health visit.     Healthy Habits:     Getting at least 3 servings of Calcium per day:  Yes    Bi-annual eye exam:  NO    Dental care twice a year:  Yes    Sleep apnea or symptoms of sleep apnea:  Excessive snoring    Diet:  Regular (no restrictions)    Frequency of exercise:  4-5 days/week    Duration of exercise:  30-45 minutes    Taking medications regularly:  Yes    Medication side effects:  None    PHQ-2 Total Score: 0    Additional concerns today:  No      Feeling well.  No specific concerns today.    Needing refill on finasteride, tolerating well with no obvious side effects.    Today's PHQ-2 Score:   PHQ-2 ( 1999 Pfizer) 10/17/2019   Q1: Little interest or pleasure in doing things 0   Q2: Feeling down, depressed or hopeless 0   PHQ-2 Score 0   Q1: Little interest or pleasure in doing things Not at all   Q2: Feeling down, depressed or hopeless Not at all   PHQ-2 Score 0       Abuse: Current or Past(Physical, Sexual or Emotional)- No  Do you feel safe in your environment? Yes    Social History     Tobacco Use     Smoking status: Former Smoker     Types: Cigarettes     Smokeless tobacco: Never Used     Tobacco comment: 2 packs/week   Substance Use Topics     Alcohol use: Yes     Alcohol/week: 0.0 standard drinks     Comment: occas     If you drink alcohol do you typically have >3 drinks per day or >7 drinks per week? No    Alcohol Use 10/17/2019   Prescreen: >3 drinks/day or >7 drinks/week? No   Prescreen: >3 drinks/day or >7 drinks/week? -   AUDIT SCORE  -       Last PSA: No results found for: PSA    Reviewed orders with patient. Reviewed health maintenance and updated orders accordingly - Yes  Lab work is in process    Reviewed and updated as needed this visit by clinical staff  Tobacco  Allergies  Meds  Med Hx  Surg Hx  Fam Hx  Soc Hx        Reviewed and updated as needed this visit by Provider            Review of  "Systems   Constitutional: Negative for chills and fever.   HENT: Negative for congestion and ear pain.    Eyes: Negative for pain.   Respiratory: Negative for cough.    Cardiovascular: Negative for chest pain.   Gastrointestinal: Negative for abdominal pain, constipation, diarrhea and hematochezia.   Genitourinary: Negative for frequency, genital sores and hematuria.   Neurological: Negative for dizziness and headaches.   Psychiatric/Behavioral: The patient is not nervous/anxious.          OBJECTIVE:   /78 (BP Location: Right arm, Patient Position: Sitting, Cuff Size: Adult Large)   Pulse 75   Temp 97.9  F (36.6  C) (Oral)   Resp 16   Ht 1.848 m (6' 0.75\")   Wt 99.8 kg (220 lb)   SpO2 99%   BMI 29.23 kg/m      Physical Exam  Vitals signs and nursing note reviewed.   Constitutional:       Appearance: He is well-developed.   HENT:      Head: Normocephalic and atraumatic.      Right Ear: Tympanic membrane, ear canal and external ear normal.      Left Ear: Tympanic membrane, ear canal and external ear normal.   Eyes:      Pupils: Pupils are equal, round, and reactive to light.   Neck:      Thyroid: No thyromegaly.   Cardiovascular:      Rate and Rhythm: Normal rate and regular rhythm.      Heart sounds: Normal heart sounds.   Pulmonary:      Effort: Pulmonary effort is normal.      Breath sounds: Normal breath sounds.   Abdominal:      General: Bowel sounds are normal.      Palpations: Abdomen is soft. There is no mass.   Musculoskeletal: Normal range of motion.   Lymphadenopathy:      Cervical: No cervical adenopathy.   Skin:     General: Skin is warm and dry.      Findings: No rash.   Neurological:      Mental Status: He is alert and oriented to person, place, and time.   Psychiatric:         Judgement: Judgment normal.           Diagnostic Test Results:  Labs reviewed in Epic    ASSESSMENT/PLAN:       ICD-10-CM    1. Routine general medical examination at a health care facility Z00.00 INFLUENZA VACCINE " "IM > 6 MONTHS VALENT IIV4 [00721]     Basic metabolic panel     Lipid panel reflex to direct LDL Non-fasting   2. Alopecia of scalp L65.9 finasteride (PROPECIA) 1 MG tablet       COUNSELING:   Reviewed preventive health counseling, as reflected in patient instructions       Regular exercise       Vision screening    Estimated body mass index is 29.23 kg/m  as calculated from the following:    Height as of this encounter: 1.848 m (6' 0.75\").    Weight as of this encounter: 99.8 kg (220 lb).     Weight management plan: Discussed healthy diet and exercise guidelines     reports that he has quit smoking. His smoking use included cigarettes. He has never used smokeless tobacco.      Counseling Resources:  ATP IV Guidelines  Pooled Cohorts Equation Calculator  FRAX Risk Assessment  ICSI Preventive Guidelines  Dietary Guidelines for Americans, 2010  USDA's MyPlate  ASA Prophylaxis  Lung CA Screening    Diogo Chiu MD  [facility]  "

## 2019-10-18 LAB
ANION GAP SERPL CALCULATED.3IONS-SCNC: 3 MMOL/L (ref 3–14)
BUN SERPL-MCNC: 20 MG/DL (ref 7–30)
CALCIUM SERPL-MCNC: 9.1 MG/DL (ref 8.5–10.1)
CHLORIDE SERPL-SCNC: 106 MMOL/L (ref 94–109)
CHOLEST SERPL-MCNC: 185 MG/DL
CO2 SERPL-SCNC: 28 MMOL/L (ref 20–32)
CREAT SERPL-MCNC: 1.31 MG/DL (ref 0.66–1.25)
GFR SERPL CREATININE-BSD FRML MDRD: 66 ML/MIN/{1.73_M2}
GLUCOSE SERPL-MCNC: 88 MG/DL (ref 70–99)
HDLC SERPL-MCNC: 55 MG/DL
LDLC SERPL CALC-MCNC: 99 MG/DL
NONHDLC SERPL-MCNC: 130 MG/DL
POTASSIUM SERPL-SCNC: 4.2 MMOL/L (ref 3.4–5.3)
SODIUM SERPL-SCNC: 137 MMOL/L (ref 133–144)
TRIGL SERPL-MCNC: 155 MG/DL

## 2019-10-18 ASSESSMENT — ANXIETY QUESTIONNAIRES: GAD7 TOTAL SCORE: 0

## 2020-05-11 DIAGNOSIS — L65.9 ALOPECIA OF SCALP: ICD-10-CM

## 2020-05-11 RX ORDER — FINASTERIDE 1 MG/1
TABLET, FILM COATED ORAL
Qty: 90 TABLET | Refills: 0 | Status: SHIPPED | OUTPATIENT
Start: 2020-05-11 | End: 2020-08-21

## 2020-05-11 NOTE — TELEPHONE ENCOUNTER
Prescription approved per [facility] Refill Protocol.  Ok for RF pt is taking finasteride for male pattern baldness    Janki Cesar RN

## 2020-08-21 DIAGNOSIS — L65.9 ALOPECIA OF SCALP: ICD-10-CM

## 2020-08-21 RX ORDER — FINASTERIDE 1 MG/1
TABLET, FILM COATED ORAL
Qty: 90 TABLET | Refills: 0 | Status: SHIPPED | OUTPATIENT
Start: 2020-08-21 | End: 2020-11-20

## 2020-08-21 NOTE — TELEPHONE ENCOUNTER
Prescription approved per [facility] Refill Protocol.  Genaro Watkins RN, BSN

## 2020-11-20 DIAGNOSIS — L65.9 ALOPECIA OF SCALP: ICD-10-CM

## 2020-11-20 RX ORDER — FINASTERIDE 1 MG/1
TABLET, FILM COATED ORAL
Qty: 90 TABLET | Refills: 0 | Status: SHIPPED | OUTPATIENT
Start: 2020-11-20 | End: 2020-12-17

## 2020-11-20 NOTE — TELEPHONE ENCOUNTER
Medication is being filled for 1 time refill only due to:  Patient needs to be seen because it has been more than one year since last visit.    Routing to MA/TC pool. The Pt is due for a visit with PCP    Genaro Watkins RN, BSN

## 2020-11-20 NOTE — TELEPHONE ENCOUNTER
Left the msg to call back to clinic.Dominique Rich MA  [facility].

## 2020-11-29 ENCOUNTER — HEALTH MAINTENANCE LETTER (OUTPATIENT)
Age: 45
End: 2020-11-29

## 2020-12-17 ENCOUNTER — OFFICE VISIT (OUTPATIENT)
Dept: FAMILY MEDICINE | Facility: CLINIC | Age: 45
End: 2020-12-17
Payer: COMMERCIAL

## 2020-12-17 VITALS
RESPIRATION RATE: 16 BRPM | HEART RATE: 66 BPM | HEIGHT: 73 IN | SYSTOLIC BLOOD PRESSURE: 112 MMHG | BODY MASS INDEX: 28.37 KG/M2 | WEIGHT: 214.1 LBS | TEMPERATURE: 98 F | OXYGEN SATURATION: 100 % | DIASTOLIC BLOOD PRESSURE: 82 MMHG

## 2020-12-17 DIAGNOSIS — L65.9 ALOPECIA OF SCALP: ICD-10-CM

## 2020-12-17 DIAGNOSIS — Z13.6 CARDIOVASCULAR SCREENING; LDL GOAL LESS THAN 160: Primary | ICD-10-CM

## 2020-12-17 DIAGNOSIS — N18.2 CRF (CHRONIC RENAL FAILURE), STAGE 2 (MILD): ICD-10-CM

## 2020-12-17 DIAGNOSIS — Z00.00 ROUTINE GENERAL MEDICAL EXAMINATION AT A HEALTH CARE FACILITY: ICD-10-CM

## 2020-12-17 PROCEDURE — 99396 PREV VISIT EST AGE 40-64: CPT | Mod: 25 | Performed by: FAMILY MEDICINE

## 2020-12-17 PROCEDURE — 90471 IMMUNIZATION ADMIN: CPT | Performed by: FAMILY MEDICINE

## 2020-12-17 PROCEDURE — 90686 IIV4 VACC NO PRSV 0.5 ML IM: CPT | Performed by: FAMILY MEDICINE

## 2020-12-17 RX ORDER — FINASTERIDE 1 MG/1
1 TABLET, FILM COATED ORAL DAILY
Qty: 90 TABLET | Refills: 4 | Status: SHIPPED | OUTPATIENT
Start: 2020-12-17 | End: 2022-03-10

## 2020-12-17 ASSESSMENT — ENCOUNTER SYMPTOMS
HEMATOCHEZIA: 0
PALPITATIONS: 0
EYE PAIN: 0
WEAKNESS: 0
PARESTHESIAS: 0
HEMATURIA: 0
JOINT SWELLING: 0
ABDOMINAL PAIN: 0
NERVOUS/ANXIOUS: 0
SORE THROAT: 0
DIARRHEA: 0
DIZZINESS: 0
MYALGIAS: 0
CONSTIPATION: 0
ARTHRALGIAS: 0
HEADACHES: 0
SHORTNESS OF BREATH: 0
HEARTBURN: 0
COUGH: 0
DYSURIA: 0
NAUSEA: 0
FREQUENCY: 0
CHILLS: 0
FEVER: 0

## 2020-12-17 ASSESSMENT — MIFFLIN-ST. JEOR: SCORE: 1902.09

## 2020-12-17 NOTE — PROGRESS NOTES
SUBJECTIVE:   CC: Wayne Lam is an 45 year old male who presents for preventative health visit.     Patient has been advised of split billing requirements and indicates understanding: Yes     Healthy Habits:     Getting at least 3 servings of Calcium per day:  Yes    Bi-annual eye exam:  NO    Dental care twice a year:  Yes    Sleep apnea or symptoms of sleep apnea:  Excessive snoring    Diet:  Regular (no restrictions)    Frequency of exercise:  4-5 days/week    Duration of exercise:  30-45 minutes    Medication side effects:  None    PHQ-2 Total Score: 0    Additional concerns today:  No    Feeling well, no acute concerns.    Family history reviewed - notes mom had breast cancer at 38 in the late 80s.  Doubtful she was tested for BRCA.      Today's PHQ-2 Score:   PHQ-2 ( 1999 Pfizer) 12/17/2020   Q1: Little interest or pleasure in doing things 0   Q2: Feeling down, depressed or hopeless 0   PHQ-2 Score 0   Q1: Little interest or pleasure in doing things Not at all   Q2: Feeling down, depressed or hopeless Not at all   PHQ-2 Score 0       Abuse: Current or Past(Physical, Sexual or Emotional)- No  Do you feel safe in your environment? Yes        Social History     Tobacco Use     Smoking status: Former Smoker     Packs/day: 0.00     Years: 0.00     Pack years: 0.00     Types: Cigarettes     Smokeless tobacco: Never Used     Tobacco comment: 2 packs/week   Substance Use Topics     Alcohol use: Yes     Alcohol/week: 0.0 standard drinks     Comment: occas         Alcohol Use 12/17/2020   Prescreen: >3 drinks/day or >7 drinks/week? Yes   Prescreen: >3 drinks/day or >7 drinks/week? -   AUDIT SCORE  4       Last PSA: No results found for: PSA    Reviewed orders with patient. Reviewed health maintenance and updated orders accordingly - Yes  Lab work is in process  Labs reviewed in EPIC    Reviewed and updated as needed this visit by clinical staff  Tobacco  Allergies    Med Hx  Surg Hx  Fam Hx  Soc Hx     "    Reviewed and updated as needed this visit by Provider                    Review of Systems   Constitutional: Negative for chills and fever.   HENT: Negative for congestion, ear pain, hearing loss and sore throat.    Eyes: Negative for pain and visual disturbance.   Respiratory: Negative for cough and shortness of breath.    Cardiovascular: Negative for chest pain, palpitations and peripheral edema.   Gastrointestinal: Negative for abdominal pain, constipation, diarrhea, heartburn, hematochezia and nausea.   Genitourinary: Negative for discharge, dysuria, frequency, genital sores, hematuria, impotence and urgency.   Musculoskeletal: Negative for arthralgias, joint swelling and myalgias.   Skin: Negative for rash.   Neurological: Negative for dizziness, weakness, headaches and paresthesias.   Psychiatric/Behavioral: Negative for mood changes. The patient is not nervous/anxious.        OBJECTIVE:   /82 (BP Location: Right arm, Patient Position: Chair, Cuff Size: Adult Large)   Pulse 66   Temp 98  F (36.7  C) (Oral)   Resp 16   Ht 1.842 m (6' 0.5\")   Wt 97.1 kg (214 lb 1.6 oz)   SpO2 100%   BMI 28.64 kg/m      Physical Exam  Vitals signs and nursing note reviewed.   Constitutional:       Appearance: He is well-developed.   HENT:      Head: Normocephalic and atraumatic.      Right Ear: Tympanic membrane, ear canal and external ear normal.      Left Ear: Tympanic membrane, ear canal and external ear normal.   Eyes:      Pupils: Pupils are equal, round, and reactive to light.   Neck:      Thyroid: No thyromegaly.   Cardiovascular:      Rate and Rhythm: Normal rate and regular rhythm.      Heart sounds: Normal heart sounds.   Pulmonary:      Effort: Pulmonary effort is normal.      Breath sounds: Normal breath sounds.   Abdominal:      General: Bowel sounds are normal.      Palpations: Abdomen is soft. There is no mass.   Musculoskeletal: Normal range of motion.   Lymphadenopathy:      Cervical: No cervical " "adenopathy.   Skin:     General: Skin is warm and dry.      Findings: No rash.   Neurological:      Mental Status: He is alert and oriented to person, place, and time.   Psychiatric:         Judgment: Judgment normal.           Diagnostic Test Results:  Labs reviewed in Epic    ASSESSMENT/PLAN:       ICD-10-CM    1. CARDIOVASCULAR SCREENING; LDL GOAL LESS THAN 160  Z13.6 Lipid panel reflex to direct LDL Fasting   2. Routine general medical examination at a health care facility  Z00.00 INFLUENZA VACCINE IM > 6 MONTHS VALENT IIV4 [08171]     REVIEW OF HEALTH MAINTENANCE PROTOCOL ORDERS     Basic metabolic panel  (Ca, Cl, CO2, Creat, Gluc, K, Na, BUN)     Lipid panel reflex to direct LDL Fasting   3. CRF (chronic renal failure), stage 2 (mild)  N18.2 Basic metabolic panel  (Ca, Cl, CO2, Creat, Gluc, K, Na, BUN)   4. Alopecia of scalp  L65.9 finasteride (PROPECIA) 1 MG tablet       Patient has been advised of split billing requirements and indicates understanding: Yes  COUNSELING:   Reviewed preventive health counseling, as reflected in patient instructions    Estimated body mass index is 28.64 kg/m  as calculated from the following:    Height as of this encounter: 1.842 m (6' 0.5\").    Weight as of this encounter: 97.1 kg (214 lb 1.6 oz).     Weight management plan: Discussed healthy diet and exercise guidelines    He reports that he has quit smoking. His smoking use included cigarettes. He smoked 0.00 packs per day for 0.00 years. He has never used smokeless tobacco.      Counseling Resources:  ATP IV Guidelines  Pooled Cohorts Equation Calculator  FRAX Risk Assessment  ICSI Preventive Guidelines  Dietary Guidelines for Americans, 2010  USDA's MyPlate  ASA Prophylaxis  Lung CA Screening    Diogo Chiu MD  [facility]  "

## 2020-12-17 NOTE — PATIENT INSTRUCTIONS
Preventive Health Recommendations  Male Ages 40 to 49    Yearly exam:             See your health care provider every year in order to  o   Review health changes.   o   Discuss preventive care.    o   Review your medicines if your doctor has prescribed any.    You should be tested each year for STDs (sexually transmitted diseases) if you re at risk.     Have a cholesterol test every 5 years.     Have a colonoscopy (test for colon cancer) if someone in your family has had colon cancer or polyps before age 50.     After age 45, have a diabetes test (fasting glucose). If you are at risk for diabetes, you should have this test every 3 years.      Talk with your health care provider about whether or not a prostate cancer screening test (PSA) is right for you.    Shots: Get a flu shot each year. Get a tetanus shot every 10 years.     Nutrition:    Eat at least 5 servings of fruits and vegetables daily.     Eat whole-grain bread, whole-wheat pasta and brown rice instead of white grains and rice.     Get adequate Calcium and Vitamin D.     Lifestyle    Exercise for at least 150 minutes a week (30 minutes a day, 5 days a week). This will help you control your weight and prevent disease.     Limit alcohol to one drink per day.     No smoking.     Wear sunscreen to prevent skin cancer.     See your dentist every six months for an exam and cleaning.    Check with insurance for genetic counseling - mom's breast cancer.

## 2020-12-23 DIAGNOSIS — Z13.6 CARDIOVASCULAR SCREENING; LDL GOAL LESS THAN 160: ICD-10-CM

## 2020-12-23 DIAGNOSIS — Z00.00 ROUTINE GENERAL MEDICAL EXAMINATION AT A HEALTH CARE FACILITY: ICD-10-CM

## 2020-12-23 DIAGNOSIS — N18.2 CRF (CHRONIC RENAL FAILURE), STAGE 2 (MILD): ICD-10-CM

## 2020-12-23 LAB
ANION GAP SERPL CALCULATED.3IONS-SCNC: 3 MMOL/L (ref 3–14)
BUN SERPL-MCNC: 22 MG/DL (ref 7–30)
CALCIUM SERPL-MCNC: 9.3 MG/DL (ref 8.5–10.1)
CHLORIDE SERPL-SCNC: 106 MMOL/L (ref 94–109)
CHOLEST SERPL-MCNC: 189 MG/DL
CO2 SERPL-SCNC: 28 MMOL/L (ref 20–32)
CREAT SERPL-MCNC: 1.23 MG/DL (ref 0.66–1.25)
GFR SERPL CREATININE-BSD FRML MDRD: 70 ML/MIN/{1.73_M2}
GLUCOSE SERPL-MCNC: 96 MG/DL (ref 70–99)
HDLC SERPL-MCNC: 69 MG/DL
LDLC SERPL CALC-MCNC: 98 MG/DL
NONHDLC SERPL-MCNC: 120 MG/DL
POTASSIUM SERPL-SCNC: 4.3 MMOL/L (ref 3.4–5.3)
SODIUM SERPL-SCNC: 137 MMOL/L (ref 133–144)
TRIGL SERPL-MCNC: 108 MG/DL

## 2020-12-23 PROCEDURE — 36415 COLL VENOUS BLD VENIPUNCTURE: CPT | Performed by: FAMILY MEDICINE

## 2020-12-23 PROCEDURE — 80048 BASIC METABOLIC PNL TOTAL CA: CPT | Performed by: FAMILY MEDICINE

## 2020-12-23 PROCEDURE — 80061 LIPID PANEL: CPT | Performed by: FAMILY MEDICINE

## 2021-04-21 ENCOUNTER — OFFICE VISIT (OUTPATIENT)
Dept: FAMILY MEDICINE | Facility: CLINIC | Age: 46
End: 2021-04-21
Payer: COMMERCIAL

## 2021-04-21 VITALS
DIASTOLIC BLOOD PRESSURE: 84 MMHG | SYSTOLIC BLOOD PRESSURE: 118 MMHG | HEART RATE: 60 BPM | HEIGHT: 72 IN | WEIGHT: 210.2 LBS | TEMPERATURE: 97.9 F | RESPIRATION RATE: 16 BRPM | BODY MASS INDEX: 28.47 KG/M2 | OXYGEN SATURATION: 99 %

## 2021-04-21 DIAGNOSIS — L08.9 INFECTED SEBACEOUS CYST: Primary | ICD-10-CM

## 2021-04-21 DIAGNOSIS — L72.3 INFECTED SEBACEOUS CYST: Primary | ICD-10-CM

## 2021-04-21 PROCEDURE — 87070 CULTURE OTHR SPECIMN AEROBIC: CPT | Performed by: FAMILY MEDICINE

## 2021-04-21 PROCEDURE — 99213 OFFICE O/P EST LOW 20 MIN: CPT | Mod: 25 | Performed by: FAMILY MEDICINE

## 2021-04-21 PROCEDURE — 10060 I&D ABSCESS SIMPLE/SINGLE: CPT | Performed by: FAMILY MEDICINE

## 2021-04-21 RX ORDER — SULFAMETHOXAZOLE/TRIMETHOPRIM 800-160 MG
1 TABLET ORAL 2 TIMES DAILY
Qty: 20 TABLET | Refills: 0 | Status: SHIPPED | OUTPATIENT
Start: 2021-04-21 | End: 2021-05-01

## 2021-04-21 RX ORDER — HYDROCODONE BITARTRATE AND ACETAMINOPHEN 5; 325 MG/1; MG/1
1 TABLET ORAL EVERY 6 HOURS PRN
Qty: 4 TABLET | Refills: 0 | Status: SHIPPED | OUTPATIENT
Start: 2021-04-21 | End: 2021-04-23

## 2021-04-21 ASSESSMENT — MIFFLIN-ST. JEOR: SCORE: 1876.46

## 2021-04-21 NOTE — PROGRESS NOTES
Assessment & Plan     Infected sebaceous cyst    - sulfamethoxazole-trimethoprim (BACTRIM DS) 800-160 MG tablet  Dispense: 20 tablet; Refill: 0  - HYDROcodone-acetaminophen (NORCO) 5-325 MG tablet  Dispense: 4 tablet; Refill: 0  - Wound Culture Aerobic Bacterial                   Return in about 2 days (around 4/23/2021), or if symptoms worsen or fail to improve.    Wayne DO Carmelo  [facility]    Gloria Black is a 45 year old who presents for the following health issues     History of Present Illness       He eats 2-3 servings of fruits and vegetables daily.He consumes 0 sweetened beverage(s) daily.He exercises with enough effort to increase his heart rate 30 to 60 minutes per day.  He exercises with enough effort to increase his heart rate 6 days per week.   He is taking medications regularly.       Derm Problem  Onset: Ongoing for One Week  Description: Nickel sized bump on center of forehead along with redness around area. No draining as of yet. Interfering with sleep. Very painful upon touch.   Intensity: 6/10  Progression of Symptoms:  worsening  Accompanying Signs & Symptoms: Now having right orbital pain/swelling. R>L  Previous history of similar problem: Has had cysts on back and neck in the past.   Precipitating factors:        Worsened by: Touching causes increase in pain  Alleviating factors:        Improved by: Nothing.  Therapies tried and outcome: Warm compresses have not helped. No drainage yet .        Review of Systems   Patient is seen for chief concern of a painful area of swelling in center of forehead, getting worse over the past week. The discomfort is starting to spread down in to the bilateral tear duct area. No history of injury, or similar prior condition. No reported changes in vision, or recent fever or chills.       Objective    /84 (BP Location: Right arm, Patient Position: Chair, Cuff Size: Adult Regular)   Pulse 60   Temp 97.9  F (36.6  C)  (Oral)   Resp 16   Ht 1.829 m (6')   Wt 95.3 kg (210 lb 3.2 oz)   SpO2 99%   BMI 28.51 kg/m    Body mass index is 28.51 kg/m .  Physical Exam   Vital signs reviewed.  Patient is in no acute appearing distress.  Breathing appears nonlabored.  Patient is alert and oriented ×3. Patient is very pleasant, making good eye contact and responding with clear fluent speech.    Exam forehead and face: patient has a tender soft erythematous area of edema about 2.5 cm diameter in center of forehead. There is some blanched skin in center of this area, suggestive of pus under skin. No edema or discoloration in the orbital area.     Patient was agreeable to having an incision and drainage procedure done.     Procedure: area of forehead edema prepped with betadine swabs and alcohol wipe. About 1 ml of 1% lidocaine with epinephrine was used for local anesthesia. A #11 blade was used to make a shallow 0.5 cm incision, from which was expressed purulent fluid with some sebaceous material. I explored the abscess with a sterile swab, and noted depth at about 1 cm. No packing was used. Absorbent gauzed was secured over wound. No significant bleeding was noted during or after procedure.

## 2021-04-21 NOTE — PATIENT INSTRUCTIONS
The would should stop bleeding in a day, and heal shut within about 5 days. I recommend using OTC Aquaphor over the wound until it is healed.     Caution that the hyddrocodone can cause drowsiness, constipation, and has the risk of psychological addiction.  The acetaminophen which is part of this prescription should be considered when choosing other medications which may also contain acetaminophen, and these medications should not be taken within 4 hours of each other.    Thank you for choosing [facility] today for your health care needs.     [facility] primary [facility]  At [facility], we re constantly working to improve how we serve our patients and communities. We re currently making changes to the way we deliver care. Most of the work is behind the scenes, but you ll benefit from our efforts to make it easier and more convenient to stay connected to [facility] and your care team to maintain your optimal health.    Benefits of a primary care provider  If you don t have a designated primary care provider yet, we encourage you to get to know our care team online, and find a provider you d like to see. Most of our providers have a short video on their online provider page. Visit [email or web address] to explore our providers and locations.     Benefits of having a primary care provider include:      A provider who sees you regularly is more likely to notice changes in your health.    They get to know you - your health history, family history and goals, making it easier to make a health plan together.     You get to know them - making health-related conversations and decisions easier      Primary care doctors help you when you re sick or hurt - but also focus on keeping you healthy with preventive care and screenings.     Online access to your health records and care team  [employer] is our online tool that makes it easy to see your health care information and communicate with your care team. [employer] allows  you to:          View your health maintenance plan so you know when you re due for a preventive screening    Send secure messages to your care team    View your health history and visit summaries     Schedule appointments     Complete questionnaires and eCheck-in before appointments      Get care from your provider with an e-visit      View and pay your bill     Sign up at [email or web address]. Once you have an account, you also can download the mobile jesus.     Connecting to fast and convenient care  When you need fast, convenient care - consider one of the following options:       Video Visit: A convenient care option for visiting with your provider out of the comfort of your own home. Most of the things you come to the clinic to address with your provider can now be done virtually through a video. This includes your chronic medication follow up, questions or concerns you may have, and even your annual Medicare Wellness Visit.       Phone Visit: Another convenient option for follow up of common problems that may require a more in-depth discussion with your provider.       E-visit: When you need acute care quickly, or have a quick question about your medication, an E-visit is completed through [employer] and your provider will respond within one business day.        In-person     Clinic appointments: Schedule an appointment at a clinic close to you anytime online at [email or web address] or call [email or web address].     Urgent Care: Visit one of our Urgent Care locations throughout the [facility]. View locations and estimated wait times at [email or web address].   Patient Education     Infected Epidermoid Cyst (Antibiotic Treatment)   You have an epidermoid cyst. This is a small, painless lump under your skin. An epidermoid cyst is often called an epidermal cyst, an epidermal inclusion cyst, or incorrectly, a sebaceous cyst. Epidermoid cysts form slowly under the skin. They can be found on most parts of the body. But they are most often  found on areas with more hair such as the scalp, face, upper back, and genitals.   Here are some general facts about these cysts:     A cyst is a sac filled with material that is often cheesy, fatty, oily, or stringy. The material inside can be thick. Or it can be a liquid.    The area around the cyst may smell bad. If the cyst breaks open, the material inside it often smells bad as well.    You can usually move the cyst slightly if you try.    The cyst can be smaller than a pea or as large as a few inches.    The cyst is usually not painful, unless it becomes inflamed or infected.  Your cyst became infected and your healthcare provider wants to treat it with antibiotics. You will likely take the antibiotics by mouth or apply it as a cream, or both. If the antibiotics don t clear up the infection, the cyst will need to be drained by making a small cut (incision). Local anesthesia will be used to numb the area before the incision and drainage.   Home care    Resist the temptation to squeeze or pop the cyst, stick a needle in it, or cut it open. This often leads to a worsening infection and scarring.    If antibiotic pills were prescribed, take them exactly as directed. Finish the antibiotic prescribed, even though you may feel better after the first few days.    Soak the affected area in hot water or apply a hot pack (a thin, clean towel soaked in hot water) for 20 minutes at a time. Do this 3 to 4 times a day.    If your healthcare provider recommended it, apply antibiotic cream or ointment 2 to 3 times a day.    You may use over-the-counter pain medicine to control pain, unless another medicine was given. If you have chronic liver or kidney disease or ever had a stomach ulcer or gastrointestinal bleeding, talk with your healthcare provider before using these medicines.    Prevention  Once this infection has healed, reduce the risk of future infections by:     Keeping the cyst area clean by bathing or showering  daily    Avoiding tight-fitting clothing in the cyst area  Follow-up care  Follow up with your healthcare provider, or as advised. If a gauze packing was put in your wound, it should be removed in a few days as advised by your healthcare provider. Check your wound every day for the signs listed below.   When to seek medical advice  Call your healthcare provider right away if any of these occur:     Pus coming from the cyst    Increasing redness around the wound    Increasing local pain or swelling    Fever of 100.4 F (38 C) or higher, or as directed by your provider  Marylu last reviewed this educational content on 7/1/2019 2000-2021 [employer]. All rights reserved. This information is not intended as a substitute for professional medical care. Always follow your healthcare professional's instructions.

## 2021-04-23 LAB
BACTERIA SPEC CULT: NO GROWTH
Lab: NORMAL
SPECIMEN SOURCE: NORMAL

## 2021-09-19 ENCOUNTER — HEALTH MAINTENANCE LETTER (OUTPATIENT)
Age: 46
End: 2021-09-19

## 2022-01-31 ENCOUNTER — OFFICE VISIT (OUTPATIENT)
Dept: FAMILY MEDICINE | Facility: CLINIC | Age: 47
End: 2022-01-31
Payer: COMMERCIAL

## 2022-01-31 VITALS
TEMPERATURE: 98.1 F | WEIGHT: 215 LBS | OXYGEN SATURATION: 98 % | HEART RATE: 66 BPM | RESPIRATION RATE: 14 BRPM | DIASTOLIC BLOOD PRESSURE: 72 MMHG | SYSTOLIC BLOOD PRESSURE: 126 MMHG | BODY MASS INDEX: 29.16 KG/M2

## 2022-01-31 DIAGNOSIS — L03.211 FACIAL CELLULITIS: Primary | ICD-10-CM

## 2022-01-31 PROCEDURE — 99213 OFFICE O/P EST LOW 20 MIN: CPT | Performed by: FAMILY MEDICINE

## 2022-01-31 RX ORDER — CEPHALEXIN 500 MG/1
500 CAPSULE ORAL 4 TIMES DAILY
Qty: 40 CAPSULE | Refills: 0 | Status: SHIPPED | OUTPATIENT
Start: 2022-01-31 | End: 2022-02-10

## 2022-01-31 NOTE — PATIENT INSTRUCTIONS
Patient Education     Facial Cellulitis  Cellulitis is an infection of the deep layers of skin. A break in the skin, such as a cut or scratch, can let bacteria under the skin. It may also occur from an infected oil gland (pimple) or hair follicle. If the bacteria get to deep layers of the skin, it can be serious. If not treated, cellulitis can get into the bloodstream and lymph nodes. The infection can then spread throughout the body. This causes serious illness. Cellulitis on the face is especially dangerous if it affects the skin around the eyes.   Cellulitis causes the affected skin to become red, swollen, warm, and sore. The reddened areas have a visible border. You may have a fever, chills, and pain.   Cellulitis is treated with antibiotics taken for 7 to 10 days. Symptoms should get better 1 to 2 days after treatment is started. Make sure to take all the antibiotics for the full number of days until they are gone. Keep taking the medicine even if your symptoms go away.   Home care  Follow these tips:    Take all of the antibiotic medicine exactly as directed until it's gone. Don t miss any doses, especially during the first 7 days. Don t stop taking it when your symptoms get better.    Use a cool compress (face cloth soaked in cool water) on your face to help reduce swelling and pain.    You may use acetaminophen or ibuprofen to reduce pain. Don t use these if you have chronic liver or kidney disease, or ever had a stomach ulcer or gastrointestinal bleeding. Talk with your healthcare provider first.  Follow-up care  Follow up with your healthcare provider, or as advised. If your infection doesn't go away on the first antibiotic, your healthcare provider will prescribe a different one.   When to seek medical advice  Call your healthcare provider right away if any of these occur:    Fever higher of 100.4  F (38.0  C) or higher after 2 days on antibiotics    Red areas that spread    Swelling or pain that gets  worse    Fluid leaking from the skin (pus)    An eyelid that swells shut or leaks fluid (pus)    Headache or neck pain that gets worse    Unusual drowsiness or confusion    Seizure    Change in eyesight  Marylu last reviewed this educational content on 8/1/2019 2000-2021 [employer]. All rights reserved. This information is not intended as a substitute for professional medical care. Always follow your healthcare professional's instructions.

## 2022-01-31 NOTE — PROGRESS NOTES
Assessment & Plan   See after visit summary for helpful information and advice given to patient.    Facial cellulitis    - cephALEXin (KEFLEX) 500 MG capsule  Dispense: 40 capsule; Refill: 0               BMI:   Estimated body mass index is 29.16 kg/m  as calculated from the following:    Height as of 4/21/21: 1.829 m (6').    Weight as of this encounter: 97.5 kg (215 lb).           Return in about 2 days (around 2/2/2022), or if symptoms worsen or fail to improve.    Wayne DO Carmelo  [facility] is [facility] 46 year [facility] who presents for the following health issues     History of Present Illness       He eats 2-3 servings of fruits and vegetables daily.He consumes 2 sweetened beverage(s) daily.He exercises with enough effort to increase his heart rate 30 to 60 minutes per day.  He exercises with enough effort to increase his heart rate 5 days per week.   He is taking medications regularly.     Left side of nose. Started 2-3 days ago, worsening. Painful to touch. Has headache. No injury.            Review of Systems   Patient is seen for evaluation of tender swelling in his left nasal labial fold area.  No other ENT symptoms.        Objective    /72   Pulse 66   Temp 98.1  F (36.7  C) (Oral)   Resp 14   Wt 97.5 kg (215 lb)   SpO2 98%   BMI 29.16 kg/m    Body mass index is 29.16 kg/m .  Physical Exam   Vital signs reviewed.  Patient is in no acute appearing distress.  Breathing appears nonlabored.  Patient is alert and oriented ×3.  Patient is very pleasant, making good eye contact and responding with clear fluent speech.    ENT: Ear exam shows bilateral tympanic membranes to be clear without injection, nasal turbinates show no injection or edema, no pharyngeal injection or exudate.  Patient has tender nonfluctuant edema in his left nasolabial fold area, diameter approximately 4 to 5 cm.  No skin surface changes noted except for slight erythema in the area of  edema.

## 2022-02-25 ENCOUNTER — NURSE TRIAGE (OUTPATIENT)
Dept: NURSING | Facility: CLINIC | Age: 47
End: 2022-02-25
Payer: COMMERCIAL

## 2022-02-25 ENCOUNTER — MYC MEDICAL ADVICE (OUTPATIENT)
Dept: FAMILY MEDICINE | Facility: CLINIC | Age: 47
End: 2022-02-25
Payer: COMMERCIAL

## 2022-02-25 DIAGNOSIS — L03.211 FACIAL CELLULITIS: Primary | ICD-10-CM

## 2022-02-25 RX ORDER — CEPHALEXIN 500 MG/1
500 CAPSULE ORAL 4 TIMES DAILY
Qty: 40 CAPSULE | Refills: 0 | Status: SHIPPED | OUTPATIENT
Start: 2022-02-25 | End: 2022-03-07

## 2022-02-25 NOTE — TELEPHONE ENCOUNTER
"Patient calling in today stating he wants a refill of antibiotics. Seen on 1/31/22 for facial cellulitis.   Swelling an infection around the left side of the nose. \"Went away and now the infection is starting back\". Slight swelling, NO fever.    Leaving for [place] for 10 days. He is requesting a refill over the phone. Advised to make an appointment to speak about the antibiotic request.  Patient stated he does not have time to make an appointment. Advised to make a mychart visit and upload pictures. Patient asked if PCP was in the office? Transferred to scheduling to ask.   Alaina Thomas RN, BSN Care Connection Triage Nurse    Reason for Disposition    [1] Finished taking antibiotic AND [2] cellulitis symptoms are BETTER  (e.g., redness, pain  drainage, swelling) BUT [3] not completely gone    Protocols used: CELLULITIS ON ANTIBIOTIC FOLLOW-UP CALL-A-      "

## 2022-03-06 ENCOUNTER — HEALTH MAINTENANCE LETTER (OUTPATIENT)
Age: 47
End: 2022-03-06

## 2022-03-09 DIAGNOSIS — L65.9 ALOPECIA OF SCALP: ICD-10-CM

## 2022-03-09 NOTE — TELEPHONE ENCOUNTER
Routing refill request to provider for review/approval because:  Patient needs to be seen because it has been more than 1 year since last office visit.    Miscellaneous Dermatologic Agents Failed 03/09/2022 08:09 AM   Protocol Details  Refill request is not for Imiquimod, 5-Fluorouracil, or Finasteride     America Palacio RN

## 2022-03-10 RX ORDER — FINASTERIDE 1 MG/1
TABLET, FILM COATED ORAL
Qty: 30 TABLET | Refills: 0 | Status: SHIPPED | OUTPATIENT
Start: 2022-03-10 | End: 2022-04-12

## 2022-03-10 NOTE — TELEPHONE ENCOUNTER
One month fill provided, pt will need f/u appt for ongoing refill.  Please call to schedule CPE.    Diogo Chiu MD

## 2022-04-11 DIAGNOSIS — L65.9 ALOPECIA OF SCALP: ICD-10-CM

## 2022-04-12 RX ORDER — FINASTERIDE 1 MG/1
TABLET, FILM COATED ORAL
Qty: 30 TABLET | Refills: 0 | Status: SHIPPED | OUTPATIENT
Start: 2022-04-12 | End: 2022-04-19

## 2022-04-12 NOTE — TELEPHONE ENCOUNTER
Last refill until visit  Prescription approved per [facility] Refill Protocol.  Aliyah Reeder RN, BSN  [facility]

## 2022-04-19 ENCOUNTER — OFFICE VISIT (OUTPATIENT)
Dept: FAMILY MEDICINE | Facility: CLINIC | Age: 47
End: 2022-04-19
Payer: COMMERCIAL

## 2022-04-19 ENCOUNTER — PATIENT OUTREACH (OUTPATIENT)
Dept: ONCOLOGY | Facility: CLINIC | Age: 47
End: 2022-04-19

## 2022-04-19 VITALS
TEMPERATURE: 98.1 F | HEART RATE: 80 BPM | WEIGHT: 215.7 LBS | DIASTOLIC BLOOD PRESSURE: 72 MMHG | OXYGEN SATURATION: 97 % | HEIGHT: 72 IN | SYSTOLIC BLOOD PRESSURE: 112 MMHG | RESPIRATION RATE: 16 BRPM | BODY MASS INDEX: 29.22 KG/M2

## 2022-04-19 DIAGNOSIS — Z12.11 SCREEN FOR COLON CANCER: ICD-10-CM

## 2022-04-19 DIAGNOSIS — Z00.00 ROUTINE GENERAL MEDICAL EXAMINATION AT A HEALTH CARE FACILITY: Primary | ICD-10-CM

## 2022-04-19 DIAGNOSIS — L65.9 ALOPECIA OF SCALP: ICD-10-CM

## 2022-04-19 DIAGNOSIS — L72.3 SEBACEOUS CYST: ICD-10-CM

## 2022-04-19 DIAGNOSIS — Z80.3 FAMILY HISTORY OF MALIGNANT NEOPLASM OF BREAST: ICD-10-CM

## 2022-04-19 DIAGNOSIS — Z11.59 NEED FOR HEPATITIS C SCREENING TEST: ICD-10-CM

## 2022-04-19 PROBLEM — N18.2 CRF (CHRONIC RENAL FAILURE), STAGE 2 (MILD): Status: RESOLVED | Noted: 2018-10-01 | Resolved: 2022-04-19

## 2022-04-19 PROCEDURE — 90715 TDAP VACCINE 7 YRS/> IM: CPT | Performed by: FAMILY MEDICINE

## 2022-04-19 PROCEDURE — 99396 PREV VISIT EST AGE 40-64: CPT | Mod: 25 | Performed by: FAMILY MEDICINE

## 2022-04-19 PROCEDURE — 80048 BASIC METABOLIC PNL TOTAL CA: CPT | Performed by: FAMILY MEDICINE

## 2022-04-19 PROCEDURE — 99213 OFFICE O/P EST LOW 20 MIN: CPT | Mod: 25 | Performed by: FAMILY MEDICINE

## 2022-04-19 PROCEDURE — 36415 COLL VENOUS BLD VENIPUNCTURE: CPT | Performed by: FAMILY MEDICINE

## 2022-04-19 PROCEDURE — 90471 IMMUNIZATION ADMIN: CPT | Performed by: FAMILY MEDICINE

## 2022-04-19 PROCEDURE — 86803 HEPATITIS C AB TEST: CPT | Performed by: FAMILY MEDICINE

## 2022-04-19 RX ORDER — FINASTERIDE 1 MG/1
1 TABLET, FILM COATED ORAL DAILY
Qty: 90 TABLET | Refills: 1 | Status: SHIPPED | OUTPATIENT
Start: 2022-04-19 | End: 2022-11-14

## 2022-04-19 ASSESSMENT — PAIN SCALES - GENERAL: PAINLEVEL: NO PAIN (0)

## 2022-04-19 NOTE — PROGRESS NOTES
SUBJECTIVE:   CC: Wayne Lam is an 46 year old male who presents for preventative health visit.       Patient has been advised of split billing requirements and indicates understanding: Yes  Healthy Habits:     Getting at least 3 servings of Calcium per day:  Yes    Bi-annual eye exam:  NO    Dental care twice a year:  NO    Sleep apnea or symptoms of sleep apnea:  None    Diet:  Regular (no restrictions)    Frequency of exercise:  6-7 days/week    Duration of exercise:  30-45 minutes    Taking medications regularly:  No    Barriers to taking medications:  None    Medication side effects:  None    PHQ-2 Total Score: 0    Additional concerns today:  Yes  History of Present Illness       Reason for visit:  Checkup    He eats 2-3 servings of fruits and vegetables daily.He consumes 2 sweetened beverage(s) daily.He exercises with enough effort to increase his heart rate 30 to 60 minutes per day.  He exercises with enough effort to increase his heart rate 6 days per week.   He is not taking prescribed medications regularly due to None.    Uses propecia daily, has done for many years.  No issues with side effects.    Mother  of breast cancer in her 30s.  Doesn't recall if he or any of his brothers had testing for BRCA.    Cysts on back.  Do not bother him.    Today's PHQ-2 Score:   PHQ-2 (  [employer]) 2022   Q1: Little interest or pleasure in doing things 0   Q2: Feeling down, depressed or hopeless 0   PHQ-2 Score 0   PHQ-2 Total Score (12-17 Years)- Positive if 3 or more points; Administer PHQ-A if positive -   Q1: Little interest or pleasure in doing things Not at all   Q2: Feeling down, depressed or hopeless Not at all   PHQ-2 Score 0       Abuse: Current or Past(Physical, Sexual or Emotional)- No  Do you feel safe in your environment? Yes    Have you ever done Advance Care Planning? (For example, a Health Directive, POLST, or a discussion with a medical provider or your loved ones about your wishes): No,  advance care planning information given to patient to review.  Patient declined advance care planning discussion at this time.    Social History     Tobacco Use     Smoking status: Former Smoker     Packs/day: 0.00     Years: 0.00     Pack years: 0.00     Types: Cigarettes     Smokeless tobacco: Never Used     Tobacco comment: 2 packs/week   Substance Use Topics     Alcohol use: Yes     Alcohol/week: 0.0 standard drinks     Comment: occas (Beer every night)     If you drink alcohol do you typically have >3 drinks per day or >7 drinks per week? No    Alcohol Use 12/17/2020   Prescreen: >3 drinks/day or >7 drinks/week? Yes   Prescreen: >3 drinks/day or >7 drinks/week? -   AUDIT SCORE  4       Last PSA: No results found for: PSA    Reviewed orders with patient. Reviewed health maintenance and updated orders accordingly - Yes  Lab work is in process  Labs reviewed in EPIC    Reviewed and updated as needed this visit by clinical staff   Tobacco  Allergies    Med Hx  Surg Hx  Fam Hx  Soc Hx          Reviewed and updated as needed this visit by Provider                       Review of Systems  CONSTITUTIONAL: NEGATIVE for fever, chills, change in weight  INTEGUMENTARY/SKIN: NEGATIVE for worrisome rashes, moles or lesions  EYES: NEGATIVE for vision changes or irritation  ENT: NEGATIVE for ear, mouth and throat problems  RESP: NEGATIVE for significant cough or SOB  CV: NEGATIVE for chest pain, palpitations or peripheral edema  GI: NEGATIVE for nausea, abdominal pain, heartburn, or change in bowel habits   male: negative for dysuria, hematuria, decreased urinary stream, erectile dysfunction, urethral discharge  MUSCULOSKELETAL: NEGATIVE for significant arthralgias or myalgia  NEURO: NEGATIVE for weakness, dizziness or paresthesias  PSYCHIATRIC: NEGATIVE for changes in mood or affect    OBJECTIVE:   /72 (BP Location: Right arm, Patient Position: Sitting, Cuff Size: Adult Large)   Pulse 80   Temp 98.1  F (36.7   C) (Oral)   Resp 16   Ht 1.829 m (6')   Wt 97.8 kg (215 lb 11.2 oz)   SpO2 97%   BMI 29.25 kg/m      Physical Exam  Constitutional:       General: He is not in acute distress.     Appearance: He is well-developed.   HENT:      Right Ear: Tympanic membrane and external ear normal.      Left Ear: Tympanic membrane and external ear normal.      Nose: Nose normal.      Mouth/Throat:      Mouth: No oral lesions.      Pharynx: No oropharyngeal exudate.   Eyes:      General:         Right eye: No discharge.         Left eye: No discharge.      Conjunctiva/sclera: Conjunctivae normal.      Pupils: Pupils are equal, round, and reactive to light.   Neck:      Thyroid: No thyromegaly.      Trachea: No tracheal deviation.   Cardiovascular:      Rate and Rhythm: Normal rate and regular rhythm.      Pulses: Normal pulses.      Heart sounds: Normal heart sounds, S1 normal and S2 normal. No murmur heard.    No S3 or S4 sounds.   Pulmonary:      Effort: Pulmonary effort is normal. No respiratory distress.      Breath sounds: Normal breath sounds. No wheezing or rales.   Abdominal:      General: Bowel sounds are normal.      Palpations: Abdomen is soft. There is no mass.      Tenderness: There is no abdominal tenderness.   Musculoskeletal:         General: No deformity. Normal range of motion.      Cervical back: Neck supple.   Lymphadenopathy:      Cervical: No cervical adenopathy.   Skin:     General: Skin is warm and dry.      Findings: No lesion or rash.   Neurological:      Mental Status: He is alert and oriented to person, place, and time.      Motor: No abnormal muscle tone.      Deep Tendon Reflexes: Reflexes are normal and symmetric.   Psychiatric:         Speech: Speech normal.         Thought Content: Thought content normal.         Judgment: Judgment normal.               ASSESSMENT/PLAN:       ICD-10-CM    1. Routine general medical examination at a health care facility  Z00.00 REVIEW OF HEALTH MAINTENANCE PROTOCOL  ORDERS     BASIC METABOLIC PANEL     TDAP VACCINE (Adacel, Boostrix)  [[id number]]     BASIC METABOLIC PANEL   2. Screen for colon cancer  Z12.11 Adult Gastro Ref - Procedure Only     Cancer Risk Mgmt/Cancer Genetic Counseling Referral   3. Need for hepatitis C screening test  Z11.59 Hepatitis C Screen Reflex to HCV RNA Quant and Genotype     Hepatitis C Screen Reflex to HCV RNA Quant and Genotype   4. Alopecia of scalp  L65.9 finasteride (PROPECIA) 1 MG tablet     OFFICE/OUTPT VISIT,EST,LEVL III   5. Family history of malignant neoplasm of breast  Z80.3    6. Sebaceous cyst  L72.3 OFFICE/OUTPT VISIT,EST,LEVL III       Patient has been advised of split billing requirements and indicates understanding: Yes    COUNSELING:   Reviewed preventive health counseling, as reflected in patient instructions    Estimated body mass index is 29.25 kg/m  as calculated from the following:    Height as of this encounter: 1.829 m (6').    Weight as of this encounter: 97.8 kg (215 lb 11.2 oz).     Weight management plan: Discussed healthy diet and exercise guidelines    He reports that he has quit smoking. His smoking use included cigarettes. He smoked 0.00 packs per day for 0.00 years. He has never used smokeless tobacco.      Counseling Resources:  ATP IV Guidelines  Pooled Cohorts Equation Calculator  FRAX Risk Assessment  ICSI Preventive Guidelines  Dietary Guidelines for Americans, 2010  USDA's MyPlate  ASA Prophylaxis  Lung CA Screening    Diogo Chiu MD  [facility]

## 2022-04-19 NOTE — PROGRESS NOTES
"  {PROVIDER CHARTING PREFERENCE:390237}    Gloria Black is a 46 year old who presents for the following health issues {ACCOMPANIED BY STATEMENT (Optional):575167}    History of Present Illness       Reason for visit:  Checkup    He eats 2-3 servings of fruits and vegetables daily.He consumes 2 sweetened beverage(s) daily.He exercises with enough effort to increase his heart rate 30 to 60 minutes per day.  He exercises with enough effort to increase his heart rate 6 days per week.   He is taking medications regularly.       Medication Followup of  finasteride (PROPECIA) 1 MG tablet    Taking Medication as prescribed: {.:525846::\"yes\"}    Side Effects:  {NONEORCHOOSE:950518::\"None\"}    Medication Helping Symptoms:  {.:264505::\"yes\"}     Cyst on back      {additonal problems for provider to add (Optional):162575}    Review of Systems   {ROS COMP (Optional):139276}      Objective    There were no vitals taken for this visit.  There is no height or weight on file to calculate BMI.  Physical Exam   {Exam List (Optional):445918}    {Diagnostic Test Results (Optional):235362}    {AMBULATORY ATTESTATION (Optional):828789}        "

## 2022-04-20 LAB
ANION GAP SERPL CALCULATED.3IONS-SCNC: 8 MMOL/L (ref 3–14)
BUN SERPL-MCNC: 19 MG/DL (ref 7–30)
CALCIUM SERPL-MCNC: 9.5 MG/DL (ref 8.5–10.1)
CHLORIDE BLD-SCNC: 105 MMOL/L (ref 94–109)
CO2 SERPL-SCNC: 26 MMOL/L (ref 20–32)
CREAT SERPL-MCNC: 1.41 MG/DL (ref 0.66–1.25)
GFR SERPL CREATININE-BSD FRML MDRD: 62 ML/MIN/1.73M2
GLUCOSE BLD-MCNC: 97 MG/DL (ref 70–99)
HCV AB SERPL QL IA: NONREACTIVE
POTASSIUM BLD-SCNC: 4 MMOL/L (ref 3.4–5.3)
SODIUM SERPL-SCNC: 139 MMOL/L (ref 133–144)

## 2022-04-22 ENCOUNTER — TELEPHONE (OUTPATIENT)
Dept: FAMILY MEDICINE | Facility: CLINIC | Age: 47
End: 2022-04-22
Payer: COMMERCIAL

## 2022-04-22 DIAGNOSIS — R79.89 ELEVATED SERUM CREATININE: Primary | ICD-10-CM

## 2022-04-26 ENCOUNTER — HOSPITAL ENCOUNTER (OUTPATIENT)
Facility: CLINIC | Age: 47
End: 2022-04-26
Attending: INTERNAL MEDICINE | Admitting: INTERNAL MEDICINE
Payer: COMMERCIAL

## 2022-11-12 DIAGNOSIS — L65.9 ALOPECIA OF SCALP: ICD-10-CM

## 2022-11-14 RX ORDER — FINASTERIDE 1 MG/1
TABLET, FILM COATED ORAL
Qty: 90 TABLET | Refills: 0 | Status: SHIPPED | OUTPATIENT
Start: 2022-11-14 | End: 2023-02-15

## 2022-11-14 NOTE — TELEPHONE ENCOUNTER
Routing refill request to provider for review/approval because:  Miscellaneous Dermatologic Agents Failed 11/12/2022 09:28 AM   Protocol Details  Refill request is not for Imiquimod, 5-Fluorouracil, or Finasteride     Leti Palacios RN

## 2022-11-21 ENCOUNTER — HEALTH MAINTENANCE LETTER (OUTPATIENT)
Age: 47
End: 2022-11-21

## 2023-04-20 ENCOUNTER — PATIENT OUTREACH (OUTPATIENT)
Dept: CARE COORDINATION | Facility: CLINIC | Age: 48
End: 2023-04-20
Payer: COMMERCIAL

## 2023-07-09 ENCOUNTER — HEALTH MAINTENANCE LETTER (OUTPATIENT)
Age: 48
End: 2023-07-09

## 2023-08-16 DIAGNOSIS — L65.9 ALOPECIA OF SCALP: ICD-10-CM

## 2023-08-16 NOTE — LETTER
August 31, 2023      Wayne Lam  [address] [facility] [address]        Phyllis Black,    We recently received a call from your pharmacy requesting a refill request for your medication finasteride (PROPECIA). We are contacting you today to notify you that you are due for PHYSICAL for further refills.     We have authorized a one time refill of your medication to allow time for you to schedule your appointment.     Please call [phone] to schedule an appointment or if you have MyChart you can schedule with your provider as well.     Taking care of your health is important to us, and ongoing visits with your provider are vital to your care. We look forward to seeing you in the near future.     Thank you for using [facility] for your medical needs.     Sincerely,        Diogo Chiu MD

## 2023-08-17 RX ORDER — FINASTERIDE 1 MG/1
TABLET, FILM COATED ORAL
Qty: 30 TABLET | Refills: 0 | Status: SHIPPED | OUTPATIENT
Start: 2023-08-17 | End: 2023-09-21

## 2023-08-17 NOTE — TELEPHONE ENCOUNTER
Sent CAPNIA message requesting a call back for an appt. Two more attempts will be made.     Amita Smith

## 2023-08-17 NOTE — TELEPHONE ENCOUNTER
Routing refill request to provider for review/approval because:  Patient needs to be seen because it has been more than 1 year since last office visit.  Fail: Refill request is not for Imiquimod, 5-Fluorouracil, or Finasteride     John SIDHU RN 8/17/2023 at 3:12 PM

## 2023-08-24 NOTE — TELEPHONE ENCOUNTER
LVM requesting a call back for an appt. One more attempt will be made.     Adelaida Smith Non-Clinical Lead

## 2023-09-20 DIAGNOSIS — L65.9 ALOPECIA OF SCALP: ICD-10-CM

## 2023-09-20 NOTE — TELEPHONE ENCOUNTER
Routing refill request to provider for review/approval because:  Radha given x1 and patient did not follow up, please advise  Now pt has appointment scheduled 11/30/23, ok to extend?  Aliyah Reeder RN, BSN  [facility]

## 2023-09-21 RX ORDER — FINASTERIDE 1 MG/1
TABLET, FILM COATED ORAL
Qty: 90 TABLET | Refills: 0 | Status: SHIPPED | OUTPATIENT
Start: 2023-09-21 | End: 2023-11-30

## 2023-11-06 NOTE — TELEPHONE ENCOUNTER
Wayne called back, appt made for 04/19. Patient may need sushil refill to get him to this appt.    Lucinda NIEVES  [facility]   [facility]     VITAL SIGNS: I have reviewed nursing notes and confirm.  CONSTITUTIONAL: anxious but well-developed; well-nourished; in no acute distress.  SKIN: Skin exam is warm and dry, no acute rash.  EYES: PERRL, EOM intact; conjunctiva and sclera clear. no pallor or icterus  ENT: mmm  CARD: S1S2 tachycardic to 110's but regular, no edema  RESP: Normal respiratory effort, no tachypnea or distress. Lungs CTAB, no wheezes, rales or rhonchi.  ABD: soft, NT/ND.  rectal exam chaperoned by RN shows normal external anatomy without hemorrhoids.  Digital rectal exam shows brown stool without blood or black stool.  EXT: Normal ROM. No clubbing, cyanosis or edema.  NEURO: Alert, oriented. Grossly unremarkable. No focal deficits.  PSYCH: Cooperative, appropriate.

## 2023-11-30 ENCOUNTER — OFFICE VISIT (OUTPATIENT)
Dept: FAMILY MEDICINE | Facility: CLINIC | Age: 48
End: 2023-11-30
Payer: COMMERCIAL

## 2023-11-30 VITALS
DIASTOLIC BLOOD PRESSURE: 84 MMHG | SYSTOLIC BLOOD PRESSURE: 126 MMHG | HEART RATE: 75 BPM | RESPIRATION RATE: 15 BRPM | HEIGHT: 72 IN | TEMPERATURE: 98.6 F | BODY MASS INDEX: 29.23 KG/M2 | OXYGEN SATURATION: 98 % | WEIGHT: 215.8 LBS

## 2023-11-30 DIAGNOSIS — Z00.00 ROUTINE GENERAL MEDICAL EXAMINATION AT A HEALTH CARE FACILITY: Primary | ICD-10-CM

## 2023-11-30 DIAGNOSIS — L65.9 ALOPECIA OF SCALP: ICD-10-CM

## 2023-11-30 DIAGNOSIS — Z12.11 SCREEN FOR COLON CANCER: ICD-10-CM

## 2023-11-30 LAB
ANION GAP SERPL CALCULATED.3IONS-SCNC: 11 MMOL/L (ref 7–15)
BUN SERPL-MCNC: 14.9 MG/DL (ref 6–20)
CALCIUM SERPL-MCNC: 9.7 MG/DL (ref 8.6–10)
CHLORIDE SERPL-SCNC: 103 MMOL/L (ref 98–107)
CHOLEST SERPL-MCNC: 204 MG/DL
CREAT SERPL-MCNC: 1.23 MG/DL (ref 0.67–1.17)
DEPRECATED HCO3 PLAS-SCNC: 26 MMOL/L (ref 22–29)
EGFRCR SERPLBLD CKD-EPI 2021: 72 ML/MIN/1.73M2
GLUCOSE SERPL-MCNC: 95 MG/DL (ref 70–99)
HDLC SERPL-MCNC: 62 MG/DL
LDLC SERPL CALC-MCNC: 116 MG/DL
NONHDLC SERPL-MCNC: 142 MG/DL
POTASSIUM SERPL-SCNC: 4.2 MMOL/L (ref 3.4–5.3)
SODIUM SERPL-SCNC: 140 MMOL/L (ref 135–145)
TRIGL SERPL-MCNC: 130 MG/DL

## 2023-11-30 PROCEDURE — 80048 BASIC METABOLIC PNL TOTAL CA: CPT | Performed by: FAMILY MEDICINE

## 2023-11-30 PROCEDURE — 80061 LIPID PANEL: CPT | Performed by: FAMILY MEDICINE

## 2023-11-30 PROCEDURE — 99396 PREV VISIT EST AGE 40-64: CPT | Performed by: FAMILY MEDICINE

## 2023-11-30 PROCEDURE — 36415 COLL VENOUS BLD VENIPUNCTURE: CPT | Performed by: FAMILY MEDICINE

## 2023-11-30 RX ORDER — FINASTERIDE 1 MG/1
1 TABLET, FILM COATED ORAL DAILY
Qty: 90 TABLET | Refills: 3 | Status: SHIPPED | OUTPATIENT
Start: 2023-11-30

## 2023-11-30 ASSESSMENT — ENCOUNTER SYMPTOMS
FEVER: 0
SHORTNESS OF BREATH: 0
ABDOMINAL PAIN: 0
PARESTHESIAS: 0
DIARRHEA: 0
ARTHRALGIAS: 0
HEMATURIA: 0
HEARTBURN: 0
SORE THROAT: 0
DYSURIA: 0
COUGH: 0
WEAKNESS: 0
FREQUENCY: 0
JOINT SWELLING: 0
NAUSEA: 0
EYE PAIN: 0
NERVOUS/ANXIOUS: 0
HEMATOCHEZIA: 0
MYALGIAS: 0
PALPITATIONS: 0
DIZZINESS: 0
HEADACHES: 0
CONSTIPATION: 0
CHILLS: 0

## 2023-11-30 ASSESSMENT — PAIN SCALES - GENERAL: PAINLEVEL: NO PAIN (0)

## 2023-11-30 NOTE — PROGRESS NOTES
"SUBJECTIVE:   Wayne is a 48 year old, presenting for the following:  Physical        11/30/2023     1:19 PM   Additional Questions   Roomed by MR   Accompanied by NA         11/30/2023     1:19 PM   Patient Reported Additional Medications   Patient reports taking the following new medications NA       Healthy Habits:     Getting at least 3 servings of Calcium per day:  Yes    Bi-annual eye exam:  Yes    Dental care twice a year:  Yes    Sleep apnea or symptoms of sleep apnea:  None    Diet:  Regular (no restrictions)    Frequency of exercise:  4-5 days/week    Duration of exercise:  15-30 minutes    Taking medications regularly:  Yes    Medication side effects:  None    Additional concerns today:  No      Pt had creamer in his coffee but fasting otherwise for BW    Feeling well, no acute concerns today.      Today's PHQ-2 Score:       11/30/2023     1:16 PM   PHQ-2 ( 1999 [employer])   Q1: Little interest or pleasure in doing things 0   Q2: Feeling down, depressed or hopeless 0   PHQ-2 Score 0   Q1: Little interest or pleasure in doing things Not at all   Q2: Feeling down, depressed or hopeless Not at all   PHQ-2 Score 0       Social History     Tobacco Use    Smoking status: Some Days     Packs/day: 0.00     Years: 0.00     Additional pack years: 0.00     Total pack years: 0.00     Types: Cigarettes    Smokeless tobacco: Never    Tobacco comments:     2 packs/week     Update 11/30/23: Every now and then he smokes    Substance Use Topics    Alcohol use: Yes     Alcohol/week: 0.0 standard drinks of alcohol     Comment: occas (Beer every night)             11/30/2023     1:16 PM   Alcohol Use   Prescreen: >3 drinks/day or >7 drinks/week? No       Last PSA: No results found for: \"PSA\"    Reviewed orders with patient. Reviewed health maintenance and updated orders accordingly - Yes  Lab work is in process    Reviewed and updated as needed this visit by clinical staff   Tobacco  Allergies  Meds              Reviewed and " updated as needed this visit by Provider                     Review of Systems   Constitutional:  Negative for chills and fever.   HENT:  Negative for congestion, ear pain, hearing loss and sore throat.    Eyes:  Negative for pain and visual disturbance.   Respiratory:  Negative for cough and shortness of breath.    Cardiovascular:  Negative for chest pain, palpitations and peripheral edema.   Gastrointestinal:  Negative for abdominal pain, constipation, diarrhea, heartburn, hematochezia and nausea.   Genitourinary:  Negative for dysuria, frequency, genital sores, hematuria, impotence, penile discharge and urgency.   Musculoskeletal:  Negative for arthralgias, joint swelling and myalgias.   Skin:  Negative for rash.   Neurological:  Negative for dizziness, weakness, headaches and paresthesias.   Psychiatric/Behavioral:  Negative for mood changes. The patient is not nervous/anxious.      OBJECTIVE:   /84 (BP Location: Right arm, Patient Position: Sitting, Cuff Size: Adult Large)   Pulse 75   Temp 98.6  F (37  C) (Oral)   Resp 15   Ht 1.829 m (6')   Wt 97.9 kg (215 lb 12.8 oz)   SpO2 98%   BMI 29.27 kg/m      Physical Exam  Vitals and nursing note reviewed.   Constitutional:       Appearance: He is well-developed.   HENT:      Head: Normocephalic and atraumatic.      Right Ear: Tympanic membrane, ear canal and external ear normal.      Left Ear: Tympanic membrane, ear canal and external ear normal.   Eyes:      Pupils: Pupils are equal, round, and reactive to light.   Neck:      Thyroid: No thyromegaly.   Cardiovascular:      Rate and Rhythm: Normal rate and regular rhythm.      Heart sounds: Normal heart sounds.   Pulmonary:      Effort: Pulmonary effort is normal.      Breath sounds: Normal breath sounds.   Abdominal:      General: Bowel sounds are normal.      Palpations: Abdomen is soft. There is no mass.   Musculoskeletal:         General: Normal range of motion.   Lymphadenopathy:      Cervical: No  cervical adenopathy.   Skin:     General: Skin is warm and dry.      Findings: No rash.   Neurological:      Mental Status: He is alert and oriented to person, place, and time.   Psychiatric:         Judgment: Judgment normal.         Diagnostic Test Results:  Labs reviewed in Epic    ASSESSMENT/PLAN:       ICD-10-CM    1. Routine general medical examination at a health care facility  Z00.00 Basic metabolic panel  (Ca, Cl, CO2, Creat, Gluc, K, Na, BUN)     Lipid panel reflex to direct LDL Fasting      2. Screen for colon cancer  Z12.11 Colonoscopy Screening  Referral      3. Alopecia of scalp  L65.9 finasteride (PROPECIA) 1 MG tablet                COUNSELING:   Reviewed preventive health counseling, as reflected in patient instructions        He reports that he has been smoking cigarettes. He has never used smokeless tobacco.            Diogo Chiu MD  [facility]

## 2023-12-22 ENCOUNTER — TELEPHONE (OUTPATIENT)
Dept: GASTROENTEROLOGY | Facility: CLINIC | Age: 48
End: 2023-12-22
Payer: COMMERCIAL

## 2023-12-22 NOTE — TELEPHONE ENCOUNTER
"Endoscopy Scheduling Screen    Have you had a positive Covid test in the last 14 days?  No      Are you active on MyChart?   Yes      What insurance is in the chart?  Other:  med[employer]    Ordering/Referring Provider: rosa iraheta   (If ordering provider performs procedure, schedule with ordering provider unless otherwise instructed. )    BMI: Estimated body mass index is 29.27 kg/m  as calculated from the following:    Height as of 11/30/23: 1.829 m (6').    Weight as of 11/30/23: 97.9 kg (215 lb 12.8 oz).     Sedation Ordered  moderate sedation.   If patient BMI > 50 do not schedule in [facility].    If patient BMI > 45 do not schedule at [facility].    Are you taking methadone or Suboxone?  No      Are you taking any prescription medications for pain 3 or more times per week?   NO - No RN review required.      Do you have a history of malignant hyperthermia or adverse reaction to anesthesia?  No      (Females) Are you currently pregnant?          Have you been diagnosed or told you have pulmonary hypertension?   No      Do you have an LVAD?  No      Have you been told you have moderate to severe sleep apnea?  No      Have you been told you have COPD, asthma, or any other lung disease?  No      Do you have any heart conditions?  No       Have you ever had an organ transplant?   No      Have you ever had or are you awaiting a heart or lung transplant?   No      Have you had a stroke or transient ischemic attack (TIA aka \"mini stroke\" in the last 6 months?   No      Have you been diagnosed with or been told you have cirrhosis of the liver?   No      Are you currently on dialysis?   No      Do you need assistance transferring?   No    BMI: Estimated body mass index is 29.27 kg/m  as calculated from the following:    Height as of 11/30/23: 1.829 m (6').    Weight as of 11/30/23: 97.9 kg (215 lb 12.8 oz).     Is patients BMI > 40 and scheduling location [facility]?  No      Do you take an injectable medication for weight loss or diabetes " (excluding insulin)?  No      Do you take the medication Naltrexone?  No      Do you take blood thinners?  No       Prep   Are you currently on dialysis or do you have chronic kidney disease?  No      Do you have a diagnosis of diabetes?  No      Do you have a diagnosis of cystic fibrosis (CF)?  No      On a regular basis do you go 3 -5 days between bowel movements?  No      BMI > 40?  No    Preferred Pharmacy:    [facility] [address] [place] [address] [facility]  [address] [facility] [address]  Phone: [phone] Fax: [phone]      Final Scheduling Details   Colonoscopy prep sent?  Standard MiraLAX    Procedure scheduled  Colonoscopy    Surgeon:  AUNDREA     Date of procedure:  3/8/24     Pre-OP / PAC:   No - Not required for this site.    Location  [facility] - Patient preference.    Sedation   Moderate Sedation - Per order.      Patient Reminders:   You will receive a call from a Nurse to review instructions and health history.  This assessment must be completed prior to your procedure.  Failure to complete the Nurse assessment may result in the procedure being cancelled.      On the day of your procedure, please designate an adult(s) who can drive you home stay with you for the next 24 hours. The medicines used in the exam will make you sleepy. You will not be able to drive.      You cannot take public transportation, ride share services, or non-medical taxi service without a responsible caregiver.  Medical transport services are allowed with the requirement that a responsible caregiver will receive you at your destination.  We require that drivers and caregivers are confirmed prior to your procedure.

## 2023-12-22 NOTE — LETTER
12/22/2023         RE: Wayne Lam  [address] [facility] [address]        Standard MiraLAX Bowel Prep  Prep Instructions for your Colonoscopy  Pre-Assessment Phone Number: [facility]; [phone] option 4    Please read these instructions carefully at least 7 days prior to your colonoscopy procedure. Be sure to follow all directions completely. The inside of your colon must be clean to allow for a complete examination for the presence of any growths, polyps, and/or abnormalities, as well as their biopsy or removal. A number of tips are included in order to make this part of the procedure as comfortable as possible.    Getting ready:   A nurse will call you to go over instructions and your health history.  It's important to complete the nurse assessment before your procedure. If you don't, your procedure might have to be canceled.  You must arrange for an adult to drive you home after your exam. Your colonoscopy cannot be done unless you have a ride. If you need to use public transportation, someone must ride with you and stay with you for a minimum of 24 hours.  Check with your insurance company to be sure they will cover this exam.  Go to the drug store and buy:  Four (4) - Dulcolax (Bisacodyl) 5mg tablets   8.3 ounce bottle of Miralax powder  64 ounces of Gatorade (not red or purple)     10 ounce bottle of clear magnesium citrate    7 days before the exam:  Talk to your prescribing provider: If you take blood thinners (such as Coumadin, Plavix, Xarelto, Eliquis, Lovenox, or others), these medications may need to be stopped temporarily before your procedure. Your prescribing provider will tell you what to do.   Talk to your prescribing provider: If you take prescription NSAIDS (such as Sulindac, Celebrex, Mobic, Relafen). Your prescribing provider will tell you what to do.   Stop taking fiber and iron supplements   Stop eating corn, popcorn, nuts and foods that contain seeds. These can stay in  the colon for many days and they can clog up the colonoscope.     3 days before the exam:  Begin a low-fiber diet (see below).   Drink at least 4 to 6 large glasses of water or sports drink (not red or purple) each day.     One day before the exam:  Only clear liquid diet is allowed (see below). No solid food should be eaten.   Drink at least 8 to 10 full glasses of clear liquids during the day.   Stop taking NSAID pain relievers, such as Advil, Ibuprofen, Motrin, etc.  You may take Tylenol.  Note: You will start drinking the colonoscopy prep solution at 5 PM. The timing of second step depends on your appointment arrival time. See Steps 1-2 below.    Step One:  At 4 PM, take 2 Dulcolax (Bisacodyl) tablets.   At 4 PM, mix the entire bottle of Miralax with 64 ounces of Gatorade in a pitcher and stir to dissolve the powder. Chill if desired, but do not add ice.   At 5 PM, start drinking an 8-ounce glass of the Miralax and Gatorade mixture every 15 minutes until the pitcher is HALF empty (about 4 glasses).  Store the rest in the refrigerator.   Bowel movements usually begin about one hour after your finish this first dose of Miralax. The stool is likely to be brown at this stage.   After you start drinking the solution, stay near a toilet. You may have watery stools (diarrhea), mild cramping, bloating , and nausea.   You may want to use Vaseline on the skin around your anus after each bowel movement to prevent irritation. You can also use wet wipes to prevent irritation.  Continue to drink clear liquids.     At 10 PM, take 2 Dulcolax (Bisacodyl) tablets  At 10 PM start drinking an 8-ounce glass of Miralax and Gatorade mixture every 15 minutes until the pitcher is empty (about 4 glasses).       Step Two:   If you arrive for your procedure before 11 AM:    6 hours prior to your scheduled arrival to the endoscopy unit drink 10 ounces of clear Magnesium Citrate    If you arrive for your procedure after 11 AM:     At 6 AM on  the day of the exam: drink 10 ounces of clear Magnesium Citrate        Day of exam:  You may drink clear liquids only up until 2 hours before your arrival time.  You may take your necessary morning medications with sips of water  Do not take diabetes medicine by mouth until after your exam.  Do not smoke, chew tobacco, or swallow anything, including water or gum for at least 2 hours before your arrival time. This is a safety issue. Your procedure could be cancelled if you do not follow directions.  Please do not wear jewelry (i.e. earrings, rings, necklaces, watches, etc) . Leave your purse, billfold, credit cards, and other valuables at home.    Please arrive with a responsible adult who can take you home after the test and stay with you for a minimum of 24 hours: The medicine used will make you sleepy and forgetful. If you do not have someone to take you home, we will cancel your procedure. If using public transportation you must have someone to ride with you.  Please perform your nebulizer treatments and airway clearance therapy in the morning prior to the procedure (if applicable).  If you have asthma, bring your inhalers.       CLEAR LIQUID DIET   You may have:    Water, tea, coffee (no milk or cream)  Soda pop, Gatorade (not red or purple)  Jell-O, Popsicles (no milk or fruit pieces - not red or purple)  Fat-free soup broth or bouillon  Plain hard candy, such as clear life savers (not red or purple)  Clear juices and fruit-flavored drinks, such as apple juice, white grape juice, Hi-C, and Sunil-Aid (not red or purple)   Do not have:    Milk or milk products such as ice cream, malts or shakes, or coffee creamer  Red or purple drinks of any kind such as cranberry juice or grape juice. Avoid red or purple Jell-O, Popsicles, Sunil-Aid, sorbet, sherbet and candy  Juices with pulp such as orange, grapefruit, pineapple or tomato juice  Cream soups of any kind  Alcohol and beer  Protein drinks or protein powder     LOW  FIBER DIET   You may have:    Starches: White bread, rolls, biscuits, croissants, Yumiko toast, white flour tortillas, waffles, pancakes, Brazilian toast; white rice, noodles, pasta, macaroni; cooked and peeled potatoes; plain crackers, saltines; cooked farina or cream of rice; puffed rice, corn flakes, Rice Krispies, Special K   Vegetables: tender cooked and canned, vegetable broths  Fruits and fruit juices: Strained fruit juice, canned fruit without seeds or skin (not pineapple), applesauce, pear sauce, ripe bananas, melons (not watermelon)   Milk products: Milk (plain or flavored), cheese, cottage cheese, yogurt (no berries), custard, ice cream    Proteins: Tender, well-cooked ground beef, lamb, veal, ham, pork, chicken, turkey, fish or organ meats; eggs; creamy peanut butter   Fats and condiments:  Margarine, butter, oils, mayonnaise, sour cream, salad dressing, plain gravy; spices, cooked herbs; sugar, clear jelly, honey, syrup   Snacks, sweets and drinks: Pretzels, hard candy; plain cakes and cookies (no nuts or seeds); gelatin, plain pudding, sherbet, Popsicles; coffee, tea, carbonated ( fizzy ) drinks Do not have:    Starches: Breads or rolls that contain nuts, seeds or fruit; whole wheat or whole grain breads that contain more than 1 gram of fiber per slice; cornbread; corn or whole wheat tortillas; potatoes with skin; brown rice, wild rice, kasha (buckwheat), and oatmeal   Vegetables: Any raw or steamed vegetables; vegetables with seeds; corn in any form   Fruits and fruit juices: Prunes, prune juice, raisins and other dried fruits, berries and other fruits with seeds, canned pineapple juices with pulp such as orange, grapefruit, pineapple or tomato juice  Milk products: Any yogurt with nuts, seeds or berries   Proteins: Tough, fibrous meats with gristle; cooked dried beans, peas or lentils; crunchy peanut butter  Fats and condiments: Pickles, olives, relish, horseradish; jam, marmalade, preserves   Snacks,  sweets and drinks: Popcorn, nuts, seeds, granola, coconut, candies made with nuts or seeds; all desserts that contain nuts, seeds, raisins and other dried fruits, coconut, whole grains or bran.      FAQ:    Why should Miralax be mixed with Gatorade or another clear sports drink?   It is important that your body does not develop an imbalance of electrolytes with the large volume of fluid in this prep. Gatorade/sports drinks contains those electrolytes.   Does Miralax have to be mixed with Gatorade?  Gatorade, Powerade, or any of the other sports drinks are acceptable. If you are diabetic, it is acceptable to use the low sugar options, such as Gatorade Zero, Powerade Zero, G2, etc.  Can I put ice in the Gatorade/Miralax mixture?  We prefer that you mix it and put it in the refrigerator to chill instead of using ice. The ice will melt and dilute the laxative.    Why should the Miralax prep be taken in several steps?   The stool is flushed out by a large wave of fluid going through the colon. Just sipping a large volume of the solution will not achieve the desired result. Studies have shown that two smaller waves (or more in some cases) are better than one large one.    Why are the second Miralax dose and Magnesium Citrate so close to the exam?   The intestine continues to produce mucus and waste. Longer intervals between the prep and the exam can lead to less than desired results. However, the stomach must be empty at the time of the exam in order to allow safe sedation. Therefore, there should be nothing by mouth 2 hours before the exam is started.   How do you know if your colon is cleaned out?   After completing the bowel prep, your bowel movements should be all liquid and yellow. Your bowel movements will look similar to urine in the toilet. If there are pieces of stool (poop) in the toilet, or if you can't see to the bottom of the toilet, please call our office for advice. Call [phone] and ask to speak with a  nurse.   Why do you need a responsible  to take you home and stay with you?  We require a responsible adult to take you home for your safety. The sedation medicines used to relax you during the procedure can impair your judgement and reaction time, and make you forgetful and possibly a little unsteady. Do not drive, make any important decisions, or sign any legal documents for 24 hours after your procedure.   It is normal to feel bloated and gassy after your procedure. Walking will help move the air through your colon. You can take non-aspirin pain relievers that contain acetaminophen (Tylenol).     When can you eat after your procedure?  You may resume your normal diet when you feel ready, unless advised otherwise by the doctor performing your procedure. Do not drink alcohol for 24 hours after your procedure.   You many resume normal activities (work, exercise, etc.) after 24 hours.     When will you get test results?  You should have your procedure results and any lab results (if applicable) by letter, MyChart message, or phone call within 2 weeks. If you have any questions, please call the doctor that referred you for the procedure.         Updated: 06/22/2022

## 2024-02-26 ENCOUNTER — TELEPHONE (OUTPATIENT)
Dept: GASTROENTEROLOGY | Facility: CLINIC | Age: 49
End: 2024-02-26

## 2024-02-27 NOTE — TELEPHONE ENCOUNTER
Pre visit planning completed.      Procedure details:    Patient scheduled for Colonoscopy  on 3/8/24.     Arrival time: 0800. Procedure time 0845    Pre op exam needed? N/A    Facility location: [facility]; [address] [place] [address]    Sedation type: Conscious sedation     Indication for procedure: screening       Chart review:     Electronic implanted devices? No    Recent diagnosis of diverticulitis within the last 6 weeks? No    Diabetic? No      Medication review:    Anticoagulants? No    NSAIDS? Yes.  Ibuprofen (Advil, Motrin).  Holding interval of 1 day before procedure.    Other medication HOLDING recommendations:  N/A      Prep for procedure:     Bowel prep recommendation: Standard Miralax   Due to:  standard bowel prep.    Prep instructions sent via [employer]     Janessa Truong RN  Endoscopy Procedure Pre Assessment RN  [phone] option 4

## 2024-02-27 NOTE — TELEPHONE ENCOUNTER
Attempted to contact patient in order to complete pre assessment questions.     No answer. Left message to return call to [phone] option 4    Missed call communication sent via [employer].    Janessa Truong RN  Endoscopy Procedure Pre Assessment RN  [phone] option 4

## 2024-02-28 ENCOUNTER — TELEPHONE (OUTPATIENT)
Dept: GASTROENTEROLOGY | Facility: CLINIC | Age: 49
End: 2024-02-28
Payer: COMMERCIAL

## 2024-02-28 NOTE — TELEPHONE ENCOUNTER
Caller: Wayne    Reason for Reschedule/Cancellation   (please be detailed, any staff messages or encounters to note?): Work Conflict      Prior to reschedule please review:  Ordering Provider: Aram Guzmán Determined: Mod  Does patient have any ASC Exclusions, please identify?: N      Notes on Cancelled Procedure:  Procedure: Lower Endoscopy [Colonoscopy]   Date: 03/08/2024  Location: [facility]; [address] [place] [address]  Surgeon: Cleopatra      Rescheduled: Yes,   Procedure: Lower Endoscopy [Colonoscopy]    Date: 05/31/2024   Location: [facility]; [address] [place] [address]   Surgeon: Mona   Sedation Level Scheduled  Mod,  Reason for Sedation Level Order   Instructions updated and sent: Y    Does patient need PAC or Pre -Op Rescheduled? : N       Did you cancel or rescheduled an EUS procedure? No.

## 2024-03-01 NOTE — TELEPHONE ENCOUNTER
Incoming call from patient.     Patient states procedure has been rescheduled for 5/31/24. Informed patient that pre assessment team will call them  to procedure date to complete pre assessment.     Patient had no further questions or concerns at this time.     Janessa Truong RN  Endoscopy Procedure Pre Assessment RN  [phone] option 4

## 2024-05-19 ENCOUNTER — TELEPHONE (OUTPATIENT)
Dept: GASTROENTEROLOGY | Facility: CLINIC | Age: 49
End: 2024-05-19
Payer: COMMERCIAL

## 2024-05-19 NOTE — TELEPHONE ENCOUNTER
Pre visit planning completed.      Procedure details:    Patient scheduled for Colonoscopy  on 5/31/24.     Arrival time: 1315. Procedure time 1400  NOTE: Arrival time was 1410 when originally scheduled.      Facility location: [facility]; [address] [place] [address]. Check in location: Main entrance at . Come through the roundabout underneath the awning (not the ER entrance).    Sedation type: Conscious sedation     Pre op exam needed? N/A    Indication for procedure: screening       Chart review:     Electronic implanted devices? No    Recent diagnosis of diverticulitis within the last 6 weeks? No    Diabetic? No      Medication review:    Anticoagulants? No    NSAIDS? Yes.  Ibuprofen (Advil, Motrin).  Holding interval of 1 day before procedure.    Other medication HOLDING recommendations:  N/A      Prep for procedure:     Bowel prep recommendation: Standard Miralax  Due to: standard bowel prep.    Prep instructions sent via letter by Hasmukh SANDHU by writer.          Lida Vazquez RN  Endoscopy Procedure Pre Assessment RN  [phone] option 4

## 2024-05-21 NOTE — TELEPHONE ENCOUNTER
Pre assessment completed for upcoming procedure.   (Please see previous telephone encounter notes for complete details)        Procedure details:    Arrival time and facility location reviewed.    Pre op exam needed? N/A    Designated  policy reviewed. Instructed to have someone stay 6  hours post procedure.       Medication review:    Medications reviewed. Please see supporting documentation below. Holding recommendations discussed (if applicable).   NSAID medication(s): Ibuprofen (Advil, Motrin): HOLD 1 day before procedure.      Prep for procedure:     Procedure prep instructions reviewed.        Any additional information needed:  N/A      Patient  verbalized understanding and had no questions or concerns at this time.      Janessa Rebolledo RN  Endoscopy Procedure Pre Assessment RN  [phone] option 4

## 2024-05-31 ENCOUNTER — HOSPITAL ENCOUNTER (OUTPATIENT)
Facility: CLINIC | Age: 49
Discharge: HOME | End: 2024-05-31
Attending: INTERNAL MEDICINE | Admitting: INTERNAL MEDICINE
Payer: COMMERCIAL

## 2024-05-31 VITALS
BODY MASS INDEX: 28.44 KG/M2 | DIASTOLIC BLOOD PRESSURE: 83 MMHG | HEART RATE: 64 BPM | HEIGHT: 72 IN | OXYGEN SATURATION: 96 % | SYSTOLIC BLOOD PRESSURE: 110 MMHG | RESPIRATION RATE: 16 BRPM | WEIGHT: 210 LBS

## 2024-05-31 LAB — COLONOSCOPY: NORMAL

## 2024-05-31 PROCEDURE — 88305 TISSUE EXAM BY PATHOLOGIST: CPT | Mod: TC | Performed by: INTERNAL MEDICINE

## 2024-05-31 PROCEDURE — 99153 MOD SED SAME PHYS/QHP EA: CPT | Performed by: INTERNAL MEDICINE

## 2024-05-31 PROCEDURE — 45382 COLONOSCOPY W/CONTROL BLEED: CPT | Performed by: INTERNAL MEDICINE

## 2024-05-31 PROCEDURE — 88305 TISSUE EXAM BY PATHOLOGIST: CPT | Mod: 26 | Performed by: PATHOLOGY

## 2024-05-31 PROCEDURE — 45380 COLONOSCOPY AND BIOPSY: CPT | Performed by: INTERNAL MEDICINE

## 2024-05-31 PROCEDURE — G0500 MOD SEDAT ENDO SERVICE >5YRS: HCPCS | Mod: PT | Performed by: INTERNAL MEDICINE

## 2024-05-31 PROCEDURE — 45385 COLONOSCOPY W/LESION REMOVAL: CPT | Performed by: INTERNAL MEDICINE

## 2024-05-31 PROCEDURE — 250N000011 HC RX IP 250 OP 636: Performed by: INTERNAL MEDICINE

## 2024-05-31 RX ORDER — FLUMAZENIL 0.1 MG/ML
0.2 INJECTION, SOLUTION INTRAVENOUS
Status: DISCONTINUED | OUTPATIENT
Start: 2024-05-31 | End: 2024-05-31 | Stop reason: HOSPADM

## 2024-05-31 RX ORDER — FENTANYL CITRATE 50 UG/ML
50-100 INJECTION, SOLUTION INTRAMUSCULAR; INTRAVENOUS EVERY 5 MIN PRN
Status: DISCONTINUED | OUTPATIENT
Start: 2024-05-31 | End: 2024-05-31 | Stop reason: HOSPADM

## 2024-05-31 RX ORDER — ATROPINE SULFATE 0.1 MG/ML
1 INJECTION INTRAVENOUS
Status: DISCONTINUED | OUTPATIENT
Start: 2024-05-31 | End: 2024-05-31 | Stop reason: HOSPADM

## 2024-05-31 RX ORDER — NALOXONE HYDROCHLORIDE 0.4 MG/ML
0.4 INJECTION, SOLUTION INTRAMUSCULAR; INTRAVENOUS; SUBCUTANEOUS
Status: DISCONTINUED | OUTPATIENT
Start: 2024-05-31 | End: 2024-05-31 | Stop reason: HOSPADM

## 2024-05-31 RX ORDER — ONDANSETRON 2 MG/ML
4 INJECTION INTRAMUSCULAR; INTRAVENOUS
Status: DISCONTINUED | OUTPATIENT
Start: 2024-05-31 | End: 2024-05-31 | Stop reason: HOSPADM

## 2024-05-31 RX ORDER — NALOXONE HYDROCHLORIDE 0.4 MG/ML
0.2 INJECTION, SOLUTION INTRAMUSCULAR; INTRAVENOUS; SUBCUTANEOUS
Status: DISCONTINUED | OUTPATIENT
Start: 2024-05-31 | End: 2024-05-31 | Stop reason: HOSPADM

## 2024-05-31 RX ORDER — PROCHLORPERAZINE MALEATE 10 MG
10 TABLET ORAL EVERY 6 HOURS PRN
Status: DISCONTINUED | OUTPATIENT
Start: 2024-05-31 | End: 2024-05-31 | Stop reason: HOSPADM

## 2024-05-31 RX ORDER — SIMETHICONE 40MG/0.6ML
133 SUSPENSION, DROPS(FINAL DOSAGE FORM)(ML) ORAL
Status: DISCONTINUED | OUTPATIENT
Start: 2024-05-31 | End: 2024-05-31 | Stop reason: HOSPADM

## 2024-05-31 RX ORDER — DIPHENHYDRAMINE HYDROCHLORIDE 50 MG/ML
25-50 INJECTION INTRAMUSCULAR; INTRAVENOUS
Status: DISCONTINUED | OUTPATIENT
Start: 2024-05-31 | End: 2024-05-31 | Stop reason: HOSPADM

## 2024-05-31 RX ORDER — EPINEPHRINE 1 MG/ML
0.1 INJECTION, SOLUTION INTRAMUSCULAR; SUBCUTANEOUS
Status: DISCONTINUED | OUTPATIENT
Start: 2024-05-31 | End: 2024-05-31 | Stop reason: HOSPADM

## 2024-05-31 RX ORDER — LIDOCAINE 40 MG/G
CREAM TOPICAL
Status: DISCONTINUED | OUTPATIENT
Start: 2024-05-31 | End: 2024-05-31 | Stop reason: HOSPADM

## 2024-05-31 RX ORDER — ONDANSETRON 4 MG/1
4 TABLET, ORALLY DISINTEGRATING ORAL EVERY 6 HOURS PRN
Status: DISCONTINUED | OUTPATIENT
Start: 2024-05-31 | End: 2024-05-31 | Stop reason: HOSPADM

## 2024-05-31 RX ORDER — ONDANSETRON 2 MG/ML
4 INJECTION INTRAMUSCULAR; INTRAVENOUS EVERY 6 HOURS PRN
Status: DISCONTINUED | OUTPATIENT
Start: 2024-05-31 | End: 2024-05-31 | Stop reason: HOSPADM

## 2024-05-31 RX ADMIN — MIDAZOLAM 2 MG: 1 INJECTION INTRAMUSCULAR; INTRAVENOUS at 10:48

## 2024-05-31 RX ADMIN — FENTANYL CITRATE 100 MCG: 50 INJECTION, SOLUTION INTRAMUSCULAR; INTRAVENOUS at 10:48

## 2024-05-31 ASSESSMENT — ACTIVITIES OF DAILY LIVING (ADL)
ADLS_ACUITY_SCORE: 35
ADLS_ACUITY_SCORE: 35

## 2024-05-31 NOTE — H&P
Pre-Endoscopy History and Physical     Wayne Lam MRN# [id number]   YOB: 1975 Age: 48 year old     Date of Procedure: 5/31/2024  Primary care provider: Diogo Chiu  Type of Endoscopy: Colonoscopy with possible biopsy, possible polypectomy  Reason for Procedure: screen  Type of Anesthesia Anticipated: Conscious Sedation    HPI:    Wayne is a 48 year old male who will be undergoing the above procedure.      A history and physical has been performed. The patient's medications and allergies have been reviewed. The risks and benefits of the procedure and the sedation options and risks were discussed with the patient.  All questions were answered and informed consent was obtained.      He denies a personal or family history of anesthesia complications or bleeding disorders.     Patient Active Problem List   Diagnosis    Snoring    Overweight (BMI 25.0-29.9)    Premature ejaculation    Alopecia of scalp        Past Medical History:   Diagnosis Date    Accessory nipple     CARDIOVASCULAR SCREENING; LDL GOAL LESS THAN 160 2/3/2012    CRF (chronic renal failure), stage 2 (mild) 10/1/2018    Overweight (BMI 25.0-29.9) 2/3/2012    Snoring 2/3/2012    Tobacco abuse 2/3/2012        Past Surgical History:   Procedure Laterality Date    hair transplant      LASIK BILATERAL      RELEASE CARPAL TUNNEL Right 06/14/2017    Procedure: Transverse carpal ligament release of the right wrist.  Surgeon:  Keo Page MD  Location: [facility]       Social History     Tobacco Use    Smoking status: Some Days     Current packs/day: 0.00     Types: Cigarettes    Smokeless tobacco: Never    Tobacco comments:     2 packs/week     Update 11/30/23: Every now and then he smokes    Substance Use Topics    Alcohol use: Yes     Alcohol/week: 0.0 standard drinks of alcohol     Comment: occas (Beer every night)       Family History   Problem Relation Age of Onset    Breast Cancer Mother 38        death     Coronary Artery Disease Father     Myocardial Infarction Father     Cerebrovascular Disease Father     No Known Problems Brother     No Known Problems Brother     No Known Problems Daughter     Colon Cancer No family hx of        Prior to Admission medications    Medication Sig Start Date End Date Taking? Authorizing Provider   finasteride (PROPECIA) 1 MG tablet Take 1 tablet (1 mg) by mouth daily 11/30/23  Yes Diogo Chiu MD   Multiple Vitamins-Minerals (MULTIVITAMIN ADULT PO)    Yes Reported, Patient   IBUPROFEN PO     Reported, Patient       No Known Allergies     REVIEW OF SYSTEMS:   5 point ROS negative except as noted above in HPI, including Gen., Resp., CV, GI &  system review.    PHYSICAL EXAM:   Ht 1.829 m (6')   Wt 95.3 kg (210 lb)   BMI 28.48 kg/m   Estimated body mass index is 28.48 kg/m  as calculated from the following:    Height as of this encounter: 1.829 m (6').    Weight as of this encounter: 95.3 kg (210 lb).   GENERAL APPEARANCE: alert, and oriented  MENTAL STATUS: alert  AIRWAY EXAM: Mallampatti Class I (visualization of the soft palate, fauces, uvula, anterior and posterior pillars)  RESP: lungs clear to auscultation - no rales, rhonchi or wheezes  CV: regular rates and rhythm  DIAGNOSTICS:    Not indicated    IMPRESSION   ASA Class 2 - Mild systemic disease    PLAN:   Plan for Colonoscopy with possible biopsy, possible polypectomy. We discussed the risks, benefits and alternatives and the patient wished to proceed.    The above has been forwarded to the consulting provider.      Signed Electronically by: Feliberto Dunn MD  May 31, 2024

## 2024-05-31 NOTE — DISCHARGE INSTRUCTIONS
The patient has received a copy of the Provation report the doctor has written and discharge instructions have been discussed with the patient and responsible adult.  All questions were addressed and answered prior to patient discharge.        Clip Placement    This Patient has received a temporary endoscopic clip which can be safely used in a MRI with a static magnetic field of 1/5-Maritza and 3-Maritza ONLY.  Retention times for this clip vary so if you have an MRI within the next year make sure to mention you had a clip placed on this day to the MRI staff. If you were given the informational card, keep that in in your wallet or purse for 1 year.    Non-clinical testing demonstrated that the clip is MR Conditional according to ASTM -56. A patient with this device can be scanned safely in an MR system under the following conditions.    Static magnetic field of 1.5-Maritza and 3-Telsa, only.  Maximum spatial gradient magnetic field of 2,000-gauss/cm (20-T/m)  Maximum MR system reported, whole body averaged specific absorption rate (JOCELYNE) of 2-W/kg for 15 minutes of scanning (i.e., per pulse sequence) in the Normal Operating Mode.    Under the scan conditions defined, the clip is expected to produce a maximum temperature rise of 1.9 degrees C after 15 minutes of continuous scanning (i.e., per pulse sequence). In Non-clinical testing, the image artifact caused by the clip extends approximately 30-mm from this implant when imaged using a gradient echo pulse sequence and a 3-Maritza RM system.    Learning About Diverticulosis and Diverticulitis  What are diverticulosis and diverticulitis?     In diverticulosis and diverticulitis, pouches called diverticula form in the wall of the large intestine, or colon.  In diverticulosis, the pouches do not cause any pain or other symptoms.  In diverticulitis, the pouches get inflamed or infected and cause symptoms.  Doctors aren't sure what causes these pouches in the colon. But they  think that a low-fiber diet may play a role. A low-fiber diet can cause small, hard stools. This means it takes more pressure in the colon to move stools out of the body. This puts more pressure on the walls of the colon. The pressure from this may cause pouches to form in weak spots along the colon.  What are the symptoms?  In diverticulosis, most people don't have symptoms.  In diverticulitis, symptoms may last from a few hours to a week or more. They include:  Belly pain. This is usually in the lower left side. It is sometimes worse when you move. This is the most common symptom.  Fever and chills.  Bloating and gas.  Diarrhea or constipation.  Nausea and sometimes vomiting.  Not feeling like eating.  If the pouches bleed, it is called diverticular bleeding.  How can you prevent diverticulitis?  You may be able to lower your chance of getting diverticulitis. You can do this by taking steps to prevent constipation.  Eat fruits, vegetables, beans, and whole grains every day. These foods are high in fiber.  Drink plenty of fluids. If you have kidney, heart, or liver disease and have to limit fluids, talk with your doctor before you increase the amount of fluids you drink.  Get at least 30 minutes of exercise on most days of the week. Walking is a good choice.  Take a fiber supplement (such as Citrucel or Metamucil) every day if needed. Read and follow all instructions on the label.  Schedule time each day for a bowel movement. Having a daily routine may help. Take your time and do not strain when having a bowel movement.  How are these problems treated?  The best way to treat diverticulosis is to avoid constipation. Treatment for diverticulitis includes antibiotics. It often includes a change in your diet. You may need only liquids at first. Your doctor may suggest medicines for pain or belly cramps. In some cases, surgery may be needed.  Follow-up care is a key part of your treatment and safety. Be sure to make and  "go to all appointments, and call your doctor if you are having problems. It's also a good idea to know your test results and keep a list of the medicines you take.  Where can you learn more?  Go to https://www.[employer]UC[employer].net/patiented  Enter E426 in the search box to learn more about \"Learning About Diverticulosis and Diverticulitis.\"  Current as of: October 19, 2023               Content Version: 14.0    0803-5833 [employer].   Care instructions adapted under license by your healthcare professional. If you have questions about a medical condition or this instruction, always ask your healthcare professional. [employer] disclaims any warranty or liability for your use of this information.    Colon Polyps: Care Instructions  Your Care Instructions     Colon polyps are growths in the colon or the rectum. The cause of most colon polyps is not known, and most people who get them do not have any problems. But a certain kind can turn into cancer. For this reason, regular testing for colon polyps is important for people as they get older. It is also important for anyone who has an increased risk for colon cancer.  Polyps are usually found through routine colon cancer screening tests. Although most colon polyps are not cancerous, they are usually removed and then tested for cancer. Screening for colon cancer saves lives because the cancer can usually be cured if it is caught early.  If you have a polyp that is the type that can turn into cancer, you may need more tests to examine your entire colon. The doctor will remove any other polyps that are found, and you will be tested more often.  Follow-up care is a key part of your treatment and safety. Be sure to make and go to all appointments, and call your doctor if you are having problems. It's also a good idea to know your test results and keep a list of the medicines you take.  How can you care for yourself at home?  Regular exams to look for " "colon polyps are the best way to prevent polyps from turning into colon cancer. These can include stool tests, sigmoidoscopy, colonoscopy, and CT colonography. Talk with your doctor about a testing schedule that is right for you.  To prevent polyps  There is no home treatment that can prevent colon polyps. But these steps may help lower your risk for cancer.  Stay active. Being active can help you get to and stay at a healthy weight. Try to exercise on most days of the week. Walking is a good choice.  Eat well. Choose a variety of vegetables, fruits, legumes (such as peas and beans), fish, poultry, and whole grains.  Do not smoke. If you need help quitting, talk to your doctor about stop-smoking programs and medicines. These can increase your chances of quitting for good.  If you drink alcohol, limit how much you drink. Limit alcohol to 2 drinks a day for men and 1 drink a day for women.  When should you call for help?   Call your doctor now or seek immediate medical care if:    You have severe belly pain.     Your stools are maroon or very bloody.   Watch closely for changes in your health, and be sure to contact your doctor if:    You have a fever.     You have nausea or vomiting.     You have a change in bowel habits (new constipation or diarrhea).     Your symptoms get worse or are not improving as expected.   Where can you learn more?  Go to https://www.[employer].net/patiented  Enter C571 in the search box to learn more about \"Colon Polyps: Care Instructions.\"  Current as of: October 19, 2023               Content Version: 14.0    9568-5277 [employer], [employer].   Care instructions adapted under license by your healthcare professional. If you have questions about a medical condition or this instruction, always ask your healthcare professional. [employer], [employer] disclaims any warranty or liability for your use of this information.    Learning About Foods That Are Good Sources of Fiber  What foods are " "high in fiber?     The foods you eat contain nutrients, such as vitamins and minerals. Fiber is a nutrient. Your body needs the right amount to stay healthy and work as it should. You can use the list below to help you make choices about which foods to eat.  Here are some examples of foods that are good sources of fiber.  Fruits  Apple  Apricot  Avocado  Banana  Blackberries  Cherries  Melon  Pear  Raspberries  Grains  Amaranth  Barley  Bran cereal  Farro  Oat bran  Oatmeal  Quinoa  Rice (brown or wild)  Whole-grain bread  Whole-grain English muffin  Protein foods  Almonds  Beans (black, kidney, navy, ferraro)  Ricky seeds  Garbanzo beans  Lentils  Pumpkin seeds  Split peas  Sunflower seeds  Vegetables  Artichoke  Broccoli  [place] sprouts  Cabbage  Carrots  Cauliflower  Eggplant  Green peas  Kale  Pumpkin  Sweet potato  White potato  Work with your doctor to find out how much of this nutrient you need. Depending on your health, you may need more or less of it in your diet.  Where can you learn more?  Go to https://www.[employer].net/patiented  Enter F355 in the search box to learn more about \"Learning About Foods That Are Good Sources of Fiber.\"  Current as of: September 20, 2023               Content Version: 14.0    4623-0854 [employer].   Care instructions adapted under license by your healthcare professional. If you have questions about a medical condition or this instruction, always ask your healthcare professional. [employer] disclaims any warranty or liability for your use of this information.         "

## 2024-06-03 LAB
PATH REPORT.COMMENTS IMP SPEC: NORMAL
PATH REPORT.COMMENTS IMP SPEC: NORMAL
PATH REPORT.FINAL DX SPEC: NORMAL
PATH REPORT.GROSS SPEC: NORMAL
PATH REPORT.MICROSCOPIC SPEC OTHER STN: NORMAL
PATH REPORT.RELEVANT HX SPEC: NORMAL
PHOTO IMAGE: NORMAL

## 2024-10-31 ENCOUNTER — PATIENT OUTREACH (OUTPATIENT)
Dept: CARE COORDINATION | Facility: CLINIC | Age: 49
End: 2024-10-31
Payer: COMMERCIAL

## 2024-11-14 ENCOUNTER — PATIENT OUTREACH (OUTPATIENT)
Dept: CARE COORDINATION | Facility: CLINIC | Age: 49
End: 2024-11-14
Payer: COMMERCIAL

## 2025-01-20 DIAGNOSIS — L65.9 ALOPECIA OF SCALP: ICD-10-CM

## 2025-01-20 RX ORDER — FINASTERIDE 1 MG/1
1 TABLET, FILM COATED ORAL DAILY
Qty: 90 TABLET | Refills: 3 | Status: SHIPPED | OUTPATIENT
Start: 2025-01-20

## 2025-02-25 SDOH — HEALTH STABILITY: PHYSICAL HEALTH: ON AVERAGE, HOW MANY DAYS PER WEEK DO YOU ENGAGE IN MODERATE TO STRENUOUS EXERCISE (LIKE A BRISK WALK)?: 4 DAYS

## 2025-02-25 SDOH — HEALTH STABILITY: PHYSICAL HEALTH: ON AVERAGE, HOW MANY MINUTES DO YOU ENGAGE IN EXERCISE AT THIS LEVEL?: 30 MIN

## 2025-02-25 ASSESSMENT — SOCIAL DETERMINANTS OF HEALTH (SDOH): HOW OFTEN DO YOU GET TOGETHER WITH FRIENDS OR RELATIVES?: TWICE A WEEK

## 2025-02-28 ENCOUNTER — OFFICE VISIT (OUTPATIENT)
Dept: FAMILY MEDICINE | Facility: CLINIC | Age: 50
End: 2025-02-28
Payer: COMMERCIAL

## 2025-02-28 VITALS
SYSTOLIC BLOOD PRESSURE: 114 MMHG | TEMPERATURE: 97.8 F | OXYGEN SATURATION: 97 % | HEART RATE: 64 BPM | HEIGHT: 72 IN | WEIGHT: 209.8 LBS | DIASTOLIC BLOOD PRESSURE: 77 MMHG | BODY MASS INDEX: 28.42 KG/M2 | RESPIRATION RATE: 15 BRPM

## 2025-02-28 DIAGNOSIS — G89.29 CHRONIC RIGHT SHOULDER PAIN: ICD-10-CM

## 2025-02-28 DIAGNOSIS — M25.571 CHRONIC PAIN OF RIGHT ANKLE: ICD-10-CM

## 2025-02-28 DIAGNOSIS — Z00.00 PREVENTATIVE HEALTH CARE: Primary | ICD-10-CM

## 2025-02-28 DIAGNOSIS — M25.511 CHRONIC RIGHT SHOULDER PAIN: ICD-10-CM

## 2025-02-28 DIAGNOSIS — M72.2 PLANTAR FASCIITIS: ICD-10-CM

## 2025-02-28 DIAGNOSIS — G89.29 CHRONIC PAIN OF RIGHT ANKLE: ICD-10-CM

## 2025-02-28 LAB
ALBUMIN SERPL BCG-MCNC: 4.5 G/DL (ref 3.5–5.2)
ALP SERPL-CCNC: 61 U/L (ref 40–150)
ALT SERPL W P-5'-P-CCNC: 21 U/L (ref 0–70)
ANION GAP SERPL CALCULATED.3IONS-SCNC: 9 MMOL/L (ref 7–15)
AST SERPL W P-5'-P-CCNC: 22 U/L (ref 0–45)
BILIRUB SERPL-MCNC: 0.5 MG/DL
BUN SERPL-MCNC: 18.4 MG/DL (ref 6–20)
CALCIUM SERPL-MCNC: 9.6 MG/DL (ref 8.8–10.4)
CHLORIDE SERPL-SCNC: 105 MMOL/L (ref 98–107)
CREAT SERPL-MCNC: 1.33 MG/DL (ref 0.67–1.17)
EGFRCR SERPLBLD CKD-EPI 2021: 66 ML/MIN/1.73M2
GLUCOSE SERPL-MCNC: 97 MG/DL (ref 70–99)
HCO3 SERPL-SCNC: 26 MMOL/L (ref 22–29)
POTASSIUM SERPL-SCNC: 4.2 MMOL/L (ref 3.4–5.3)
PROT SERPL-MCNC: 7.2 G/DL (ref 6.4–8.3)
SODIUM SERPL-SCNC: 140 MMOL/L (ref 135–145)

## 2025-02-28 PROCEDURE — 3074F SYST BP LT 130 MM HG: CPT | Performed by: FAMILY MEDICINE

## 2025-02-28 PROCEDURE — 80053 COMPREHEN METABOLIC PANEL: CPT | Performed by: FAMILY MEDICINE

## 2025-02-28 PROCEDURE — 1125F AMNT PAIN NOTED PAIN PRSNT: CPT | Performed by: FAMILY MEDICINE

## 2025-02-28 PROCEDURE — 36415 COLL VENOUS BLD VENIPUNCTURE: CPT | Performed by: FAMILY MEDICINE

## 2025-02-28 PROCEDURE — 3078F DIAST BP <80 MM HG: CPT | Performed by: FAMILY MEDICINE

## 2025-02-28 PROCEDURE — 99214 OFFICE O/P EST MOD 30 MIN: CPT | Mod: 25 | Performed by: FAMILY MEDICINE

## 2025-02-28 PROCEDURE — 99396 PREV VISIT EST AGE 40-64: CPT | Performed by: FAMILY MEDICINE

## 2025-02-28 ASSESSMENT — PAIN SCALES - GENERAL: PAINLEVEL_OUTOF10: MILD PAIN (2)
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
## 2025-03-03 ENCOUNTER — PATIENT OUTREACH (OUTPATIENT)
Dept: CARE COORDINATION | Facility: CLINIC | Age: 50
End: 2025-03-03
Payer: COMMERCIAL

## 2025-03-05 ENCOUNTER — PATIENT OUTREACH (OUTPATIENT)
Dept: CARE COORDINATION | Facility: CLINIC | Age: 50
End: 2025-03-05
Payer: COMMERCIAL

## 2025-03-14 ENCOUNTER — ANCILLARY PROCEDURE (OUTPATIENT)
Dept: GENERAL RADIOLOGY | Facility: CLINIC | Age: 50
End: 2025-03-14
Attending: PODIATRIST
Payer: COMMERCIAL

## 2025-03-14 DIAGNOSIS — M79.671 RIGHT FOOT PAIN: ICD-10-CM

## 2025-03-14 PROCEDURE — 73630 X-RAY EXAM OF FOOT: CPT | Mod: TC | Performed by: INTERNAL MEDICINE

## 2025-03-21 PROBLEM — M25.511 CHRONIC RIGHT SHOULDER PAIN: Status: ACTIVE | Noted: 2025-03-21

## 2025-03-21 PROBLEM — G89.29 CHRONIC RIGHT SHOULDER PAIN: Status: ACTIVE | Noted: 2025-03-21

## 2025-04-30 DIAGNOSIS — L65.9 ALOPECIA OF SCALP: ICD-10-CM

## 2025-05-01 RX ORDER — FINASTERIDE 1 MG/1
1 TABLET, FILM COATED ORAL DAILY
Qty: 90 TABLET | Refills: 2 | Status: SHIPPED | OUTPATIENT
Start: 2025-05-01

## (undated) DEVICE — KIT ENDO TURNOVER/PROCEDURE W/CLEAN A SCOPE LINERS 103888

## (undated) DEVICE — CLIP HEMOSTASIS ASSURANCE W16 MM BX00711884

## (undated) DEVICE — ENDO SNARE EXACTO COLD 9MM LOOP 2.4MMX230CM 00711115

## (undated) DEVICE — ENDO TRAP POLYP QUICK CATCH 710201

## (undated) DEVICE — ENDO FORCEP SPIKED SERRATED SHAFT JUMBO 239CM G56998

## (undated) RX ORDER — FENTANYL CITRATE 50 UG/ML
INJECTION, SOLUTION INTRAMUSCULAR; INTRAVENOUS
Status: DISPENSED
Start: 2024-05-31